# Patient Record
Sex: MALE | Race: WHITE | NOT HISPANIC OR LATINO | URBAN - METROPOLITAN AREA
[De-identification: names, ages, dates, MRNs, and addresses within clinical notes are randomized per-mention and may not be internally consistent; named-entity substitution may affect disease eponyms.]

---

## 2017-01-13 ENCOUNTER — OFFICE (AMBULATORY)
Dept: URBAN - METROPOLITAN AREA CLINIC 64 | Facility: CLINIC | Age: 57
End: 2017-01-13
Payer: COMMERCIAL

## 2017-01-13 VITALS
HEART RATE: 81 BPM | HEIGHT: 67 IN | DIASTOLIC BLOOD PRESSURE: 92 MMHG | WEIGHT: 112 LBS | SYSTOLIC BLOOD PRESSURE: 127 MMHG

## 2017-01-13 DIAGNOSIS — R10.84 GENERALIZED ABDOMINAL PAIN: ICD-10-CM

## 2017-01-13 DIAGNOSIS — R11.2 NAUSEA WITH VOMITING, UNSPECIFIED: ICD-10-CM

## 2017-01-13 DIAGNOSIS — R19.7 DIARRHEA, UNSPECIFIED: ICD-10-CM

## 2017-01-13 DIAGNOSIS — R14.0 ABDOMINAL DISTENSION (GASEOUS): ICD-10-CM

## 2017-01-13 PROCEDURE — 99213 OFFICE O/P EST LOW 20 MIN: CPT | Performed by: NURSE PRACTITIONER

## 2017-01-17 ENCOUNTER — HOSPITAL ENCOUNTER (OUTPATIENT)
Dept: CARDIOLOGY | Facility: HOSPITAL | Age: 57
Discharge: HOME | End: 2017-01-17
Attending: INTERNAL MEDICINE | Admitting: INTERNAL MEDICINE

## 2017-01-17 ENCOUNTER — HOSPITAL ENCOUNTER (OUTPATIENT)
Dept: ONCOLOGY | Facility: CLINIC | Age: 57
Discharge: HOME | End: 2017-01-17
Attending: INTERNAL MEDICINE | Admitting: INTERNAL MEDICINE

## 2017-01-20 ENCOUNTER — INPATIENT HOSPITAL (AMBULATORY)
Dept: URBAN - METROPOLITAN AREA HOSPITAL 84 | Facility: HOSPITAL | Age: 57
End: 2017-01-20
Payer: COMMERCIAL

## 2017-01-20 DIAGNOSIS — K62.4 STENOSIS OF ANUS AND RECTUM: ICD-10-CM

## 2017-01-20 PROCEDURE — 45337 SIGMOIDOSCOPY & DECOMPRESS: CPT | Performed by: INTERNAL MEDICINE

## 2017-01-21 PROCEDURE — 99231 SBSQ HOSP IP/OBS SF/LOW 25: CPT | Performed by: INTERNAL MEDICINE

## 2017-01-23 ENCOUNTER — INPATIENT HOSPITAL (AMBULATORY)
Dept: URBAN - METROPOLITAN AREA HOSPITAL 84 | Facility: HOSPITAL | Age: 57
End: 2017-01-23
Payer: COMMERCIAL

## 2017-01-23 DIAGNOSIS — K56.7 ILEUS, UNSPECIFIED: ICD-10-CM

## 2017-01-23 DIAGNOSIS — C20 MALIGNANT NEOPLASM OF RECTUM: ICD-10-CM

## 2017-01-23 PROCEDURE — 99231 SBSQ HOSP IP/OBS SF/LOW 25: CPT | Performed by: NURSE PRACTITIONER

## 2017-02-08 ENCOUNTER — HOSPITAL ENCOUNTER (OUTPATIENT)
Dept: WOUND CARE | Facility: HOSPITAL | Age: 57
Discharge: HOME | End: 2017-02-08
Attending: NURSE PRACTITIONER | Admitting: NURSE PRACTITIONER

## 2017-02-13 ENCOUNTER — ON CAMPUS - OUTPATIENT (AMBULATORY)
Dept: URBAN - METROPOLITAN AREA HOSPITAL 77 | Facility: HOSPITAL | Age: 57
End: 2017-02-13
Payer: COMMERCIAL

## 2017-02-13 DIAGNOSIS — K62.4 STENOSIS OF ANUS AND RECTUM: ICD-10-CM

## 2017-02-13 DIAGNOSIS — Z85.040 PERSONAL HISTORY OF MALIGNANT CARCINOID TUMOR OF RECTUM: ICD-10-CM

## 2017-02-13 PROCEDURE — 45391 COLONOSCOPY W/ENDOSCOPE US: CPT | Performed by: INTERNAL MEDICINE

## 2017-02-15 ENCOUNTER — HOSPITAL ENCOUNTER (OUTPATIENT)
Dept: WOUND CARE | Facility: HOSPITAL | Age: 57
Discharge: HOME | End: 2017-02-15
Attending: NURSE PRACTITIONER | Admitting: NURSE PRACTITIONER

## 2017-02-20 ENCOUNTER — HOSPITAL ENCOUNTER (OUTPATIENT)
Dept: RADIATION ONCOLOGY | Facility: HOSPITAL | Age: 57
Discharge: HOME | End: 2017-02-20
Attending: RADIOLOGY | Admitting: RADIOLOGY

## 2017-02-20 ENCOUNTER — HOSPITAL ENCOUNTER (OUTPATIENT)
Dept: ONCOLOGY | Facility: CLINIC | Age: 57
Discharge: HOME | End: 2017-02-20
Attending: INTERNAL MEDICINE | Admitting: INTERNAL MEDICINE

## 2017-03-21 ENCOUNTER — HOSPITAL ENCOUNTER (OUTPATIENT)
Dept: PREOP | Facility: HOSPITAL | Age: 57
Setting detail: HOSPITAL OUTPATIENT SURGERY
Discharge: HOME | End: 2017-03-21
Attending: SURGERY | Admitting: SURGERY

## 2017-03-28 ENCOUNTER — HOSPITAL ENCOUNTER (OUTPATIENT)
Dept: ONCOLOGY | Facility: CLINIC | Age: 57
Discharge: HOME | End: 2017-03-28
Attending: INTERNAL MEDICINE | Admitting: INTERNAL MEDICINE

## 2017-03-28 LAB
ALBUMIN SERPL-MCNC: 3.6 G/DL (ref 3.5–4.8)
ALBUMIN/GLOB SERPL: 1.3 {RATIO} (ref 1–1.7)
ALP SERPL-CCNC: 118 IU/L (ref 32–91)
ALT SERPL-CCNC: 27 IU/L (ref 17–63)
ANION GAP SERPL CALC-SCNC: 13.1 MMOL/L (ref 10–20)
AST SERPL-CCNC: 32 IU/L (ref 15–41)
BILIRUB SERPL-MCNC: 0.6 MG/DL (ref 0.3–1.2)
BUN SERPL-MCNC: 10 MG/DL (ref 8–20)
BUN/CREAT SERPL: 14.3 (ref 6.2–20.3)
CALCIUM SERPL-MCNC: 9 MG/DL (ref 8.9–10.3)
CHLORIDE SERPL-SCNC: 107 MMOL/L (ref 101–111)
CONV CO2: 27 MMOL/L (ref 22–32)
CONV TOTAL PROTEIN: 6.4 G/DL (ref 6.1–7.9)
CREAT UR-MCNC: 0.7 MG/DL (ref 0.7–1.2)
GLOBULIN UR ELPH-MCNC: 2.8 G/DL (ref 2.5–3.8)
GLUCOSE SERPL-MCNC: 81 MG/DL (ref 65–99)
POTASSIUM SERPL-SCNC: 4.1 MMOL/L (ref 3.6–5.1)
SODIUM SERPL-SCNC: 143 MMOL/L (ref 136–144)

## 2017-04-13 ENCOUNTER — HOSPITAL ENCOUNTER (OUTPATIENT)
Dept: ONCOLOGY | Facility: CLINIC | Age: 57
Discharge: HOME | End: 2017-04-13
Attending: INTERNAL MEDICINE | Admitting: INTERNAL MEDICINE

## 2017-04-24 ENCOUNTER — HOSPITAL ENCOUNTER (OUTPATIENT)
Dept: ONCOLOGY | Facility: CLINIC | Age: 57
Discharge: HOME | End: 2017-04-24
Attending: INTERNAL MEDICINE | Admitting: INTERNAL MEDICINE

## 2017-05-18 ENCOUNTER — OFFICE (AMBULATORY)
Dept: URBAN - METROPOLITAN AREA CLINIC 51 | Facility: CLINIC | Age: 57
End: 2017-05-18
Payer: COMMERCIAL

## 2017-05-18 DIAGNOSIS — K62.4 STENOSIS OF ANUS AND RECTUM: ICD-10-CM

## 2017-05-18 DIAGNOSIS — R19.5 OTHER FECAL ABNORMALITIES: ICD-10-CM

## 2017-05-18 DIAGNOSIS — Z93.3 COLOSTOMY STATUS: ICD-10-CM

## 2017-05-18 DIAGNOSIS — K62.89 OTHER SPECIFIED DISEASES OF ANUS AND RECTUM: ICD-10-CM

## 2017-05-18 PROCEDURE — 91122: CPT | Performed by: INTERNAL MEDICINE

## 2017-05-18 PROCEDURE — 91120: CPT | Mod: 59 | Performed by: INTERNAL MEDICINE

## 2017-05-23 ENCOUNTER — HOSPITAL ENCOUNTER (OUTPATIENT)
Dept: ONCOLOGY | Facility: CLINIC | Age: 57
Discharge: HOME | End: 2017-05-23
Attending: INTERNAL MEDICINE | Admitting: INTERNAL MEDICINE

## 2017-06-20 ENCOUNTER — HOSPITAL ENCOUNTER (OUTPATIENT)
Dept: ONCOLOGY | Facility: CLINIC | Age: 57
Discharge: HOME | End: 2017-06-20
Attending: INTERNAL MEDICINE | Admitting: INTERNAL MEDICINE

## 2017-06-20 LAB
ALBUMIN SERPL-MCNC: 4 G/DL (ref 3.5–4.8)
ALBUMIN/GLOB SERPL: 1.5 {RATIO} (ref 1–1.7)
ALP SERPL-CCNC: 103 IU/L (ref 32–91)
ALT SERPL-CCNC: 25 IU/L (ref 17–63)
ANION GAP SERPL CALC-SCNC: 12.5 MMOL/L (ref 10–20)
AST SERPL-CCNC: 30 IU/L (ref 15–41)
BILIRUB SERPL-MCNC: 0.8 MG/DL (ref 0.3–1.2)
BUN SERPL-MCNC: 6 MG/DL (ref 8–20)
BUN/CREAT SERPL: 6.7 (ref 6.2–20.3)
CALCIUM SERPL-MCNC: 8.8 MG/DL (ref 8.9–10.3)
CHLORIDE SERPL-SCNC: 108 MMOL/L (ref 101–111)
CONV CO2: 24 MMOL/L (ref 22–32)
CONV TOTAL PROTEIN: 6.7 G/DL (ref 6.1–7.9)
CREAT UR-MCNC: 0.9 MG/DL (ref 0.7–1.2)
FOLATE SERPL-MCNC: 16.1 NG/ML (ref 5.9–24.8)
GLOBULIN UR ELPH-MCNC: 2.7 G/DL (ref 2.5–3.8)
GLUCOSE SERPL-MCNC: 116 MG/DL (ref 65–99)
LDH SERPL-CCNC: 171 IU/L (ref 98–192)
MAGNESIUM UR-MCNC: 1.06 % (ref 0.5–1.5)
POTASSIUM SERPL-SCNC: 3.5 MMOL/L (ref 3.6–5.1)
RETICS/RBC NFR MANUAL: 0.05 10*6/UL
SODIUM SERPL-SCNC: 141 MMOL/L (ref 136–144)

## 2017-06-21 LAB
ANA SER QL IA: NORMAL
HAPTOGLOB SERPL-MCNC: 97 MG/DL (ref 36–195)
PROT PATTERN SERPL IFE-IMP: NORMAL

## 2017-06-22 LAB
ALBUMIN SERPL-MCNC: 3.8 G/DL (ref 3.5–4.8)
ALPHA1 GLOB FLD ELPH-MCNC: 0.2 GM/DL (ref 0.1–0.4)
ALPHA2 GLOB SERPL ELPH-MCNC: 0.6 GM/DL (ref 0.5–1)
B-GLOBULIN SERPL ELPH-MCNC: 1.1 GM/DL (ref 0.7–1.4)
CONV TOTAL PROTEIN: 6.7 G/DL (ref 6.1–7.9)
GAMMA GLOB SERPL ELPH-MCNC: 1 GM/DL (ref 0.6–1.6)
INSULIN SERPL-ACNC: NORMAL U[IU]/ML

## 2017-07-03 ENCOUNTER — HOSPITAL ENCOUNTER (OUTPATIENT)
Dept: ONCOLOGY | Facility: CLINIC | Age: 57
Discharge: HOME | End: 2017-07-03
Attending: INTERNAL MEDICINE | Admitting: INTERNAL MEDICINE

## 2017-07-25 ENCOUNTER — HOSPITAL ENCOUNTER (OUTPATIENT)
Dept: ONCOLOGY | Facility: CLINIC | Age: 57
Discharge: HOME | End: 2017-07-25
Attending: INTERNAL MEDICINE | Admitting: INTERNAL MEDICINE

## 2017-08-22 ENCOUNTER — HOSPITAL ENCOUNTER (OUTPATIENT)
Dept: ONCOLOGY | Facility: CLINIC | Age: 57
Discharge: HOME | End: 2017-08-22
Attending: INTERNAL MEDICINE | Admitting: INTERNAL MEDICINE

## 2017-09-26 ENCOUNTER — CLINICAL SUPPORT (OUTPATIENT)
Dept: ONCOLOGY | Facility: HOSPITAL | Age: 57
End: 2017-09-26

## 2017-09-26 ENCOUNTER — HOSPITAL ENCOUNTER (OUTPATIENT)
Dept: ONCOLOGY | Facility: HOSPITAL | Age: 57
Discharge: HOME | End: 2017-09-26
Attending: INTERNAL MEDICINE | Admitting: INTERNAL MEDICINE

## 2017-09-26 ENCOUNTER — HOSPITAL ENCOUNTER (OUTPATIENT)
Dept: ONCOLOGY | Facility: CLINIC | Age: 57
Setting detail: INFUSION SERIES
Discharge: HOME | End: 2017-09-26
Attending: INTERNAL MEDICINE | Admitting: INTERNAL MEDICINE

## 2017-09-26 NOTE — PROGRESS NOTES
PATIENTS ONCOLOGY RECORD LOCATED IN [facility]      Subjective     Name:  KASSIE HENLEY     Date:  2017  Address:  [address] [place] [address] [place] [facility]  Home: [phone]  :  1960 AGE:  57 y.o.        RECORDS OBTAINED:  Patients Oncology Record is located in [facility]

## 2017-10-24 ENCOUNTER — HOSPITAL ENCOUNTER (OUTPATIENT)
Dept: ONCOLOGY | Facility: HOSPITAL | Age: 57
Discharge: HOME | End: 2017-10-24
Attending: INTERNAL MEDICINE | Admitting: INTERNAL MEDICINE

## 2017-11-21 ENCOUNTER — CLINICAL SUPPORT (OUTPATIENT)
Dept: ONCOLOGY | Facility: HOSPITAL | Age: 57
End: 2017-11-21

## 2017-11-21 ENCOUNTER — HOSPITAL ENCOUNTER (OUTPATIENT)
Dept: ONCOLOGY | Facility: CLINIC | Age: 57
Setting detail: INFUSION SERIES
Discharge: HOME | End: 2017-11-21
Attending: INTERNAL MEDICINE | Admitting: INTERNAL MEDICINE

## 2017-11-21 ENCOUNTER — HOSPITAL ENCOUNTER (OUTPATIENT)
Dept: ONCOLOGY | Facility: HOSPITAL | Age: 57
Discharge: HOME | End: 2017-11-21
Attending: INTERNAL MEDICINE | Admitting: INTERNAL MEDICINE

## 2017-11-21 NOTE — PROGRESS NOTES
PATIENTS ONCOLOGY RECORD LOCATED IN [facility]      Subjective     Name:  KASSIE HENLEY     Date:  2017  Address:  [address] [place] [address] [place] [facility]  Home: [phone]  :  1960 AGE:  57 y.o.        RECORDS OBTAINED:  Patients Oncology Record is located in [facility]

## 2017-12-19 ENCOUNTER — HOSPITAL ENCOUNTER (OUTPATIENT)
Dept: ONCOLOGY | Facility: CLINIC | Age: 57
Setting detail: INFUSION SERIES
Discharge: HOME | End: 2017-12-19
Attending: INTERNAL MEDICINE | Admitting: INTERNAL MEDICINE

## 2017-12-19 ENCOUNTER — HOSPITAL ENCOUNTER (OUTPATIENT)
Dept: ONCOLOGY | Facility: HOSPITAL | Age: 57
Discharge: HOME | End: 2017-12-19
Attending: INTERNAL MEDICINE | Admitting: INTERNAL MEDICINE

## 2017-12-19 ENCOUNTER — CLINICAL SUPPORT (OUTPATIENT)
Dept: ONCOLOGY | Facility: HOSPITAL | Age: 57
End: 2017-12-19

## 2017-12-19 NOTE — PROGRESS NOTES
PATIENTS ONCOLOGY RECORD LOCATED IN [facility]      Subjective     Name:  KASSIE HENLEY     Date:  2017  Address:  [address] [place] [address] [place] [facility]  Home: [phone]  :  1960 AGE:  57 y.o.        RECORDS OBTAINED:  Patients Oncology Record is located in [facility]

## 2018-01-08 ENCOUNTER — HOSPITAL ENCOUNTER (OUTPATIENT)
Dept: ONCOLOGY | Facility: HOSPITAL | Age: 58
Discharge: HOME | End: 2018-01-08
Attending: INTERNAL MEDICINE | Admitting: INTERNAL MEDICINE

## 2018-01-08 LAB — CREAT BLDA-MCNC: 1 MG/DL (ref 0.6–1.3)

## 2018-01-09 ENCOUNTER — HOSPITAL ENCOUNTER (OUTPATIENT)
Dept: ONCOLOGY | Facility: HOSPITAL | Age: 58
Discharge: HOME | End: 2018-01-09
Attending: INTERNAL MEDICINE | Admitting: INTERNAL MEDICINE

## 2018-01-09 ENCOUNTER — HOSPITAL ENCOUNTER (OUTPATIENT)
Dept: ONCOLOGY | Facility: CLINIC | Age: 58
Setting detail: INFUSION SERIES
Discharge: HOME | End: 2018-01-09
Attending: INTERNAL MEDICINE | Admitting: INTERNAL MEDICINE

## 2018-01-09 ENCOUNTER — CLINICAL SUPPORT (OUTPATIENT)
Dept: ONCOLOGY | Facility: HOSPITAL | Age: 58
End: 2018-01-09

## 2018-01-09 LAB
ALBUMIN SERPL-MCNC: 4 G/DL (ref 3.5–4.8)
ALBUMIN/GLOB SERPL: 1.3 {RATIO} (ref 1–1.7)
ALP SERPL-CCNC: 86 IU/L (ref 32–91)
ALT SERPL-CCNC: 17 IU/L (ref 17–63)
ANION GAP SERPL CALC-SCNC: 11 MMOL/L (ref 10–20)
AST SERPL-CCNC: 22 IU/L (ref 15–41)
BILIRUB SERPL-MCNC: 0.8 MG/DL (ref 0.3–1.2)
BUN SERPL-MCNC: 8 MG/DL (ref 8–20)
BUN/CREAT SERPL: 7.3 (ref 6.2–20.3)
CALCIUM SERPL-MCNC: 9.5 MG/DL (ref 8.9–10.3)
CHLORIDE SERPL-SCNC: 104 MMOL/L (ref 101–111)
CONV CO2: 28 MMOL/L (ref 22–32)
CONV TOTAL PROTEIN: 7 G/DL (ref 6.1–7.9)
CREAT UR-MCNC: 1.1 MG/DL (ref 0.7–1.2)
GLOBULIN UR ELPH-MCNC: 3 G/DL (ref 2.5–3.8)
GLUCOSE SERPL-MCNC: 86 MG/DL (ref 65–99)
POTASSIUM SERPL-SCNC: 4 MMOL/L (ref 3.6–5.1)
SODIUM SERPL-SCNC: 139 MMOL/L (ref 136–144)

## 2018-01-09 NOTE — PROGRESS NOTES
PATIENTS ONCOLOGY RECORD LOCATED IN [facility]      Subjective     Name:  KASSIE HENLEY     Date:  2018  Address:  [address] [place] [address] [place] [facility]  Home: [phone]  :  1960 AGE:  57 y.o.        RECORDS OBTAINED:  Patients Oncology Record is located in [facility]

## 2018-02-06 ENCOUNTER — CLINICAL SUPPORT (OUTPATIENT)
Dept: ONCOLOGY | Facility: HOSPITAL | Age: 58
End: 2018-02-06

## 2018-02-06 ENCOUNTER — HOSPITAL ENCOUNTER (OUTPATIENT)
Dept: ONCOLOGY | Facility: CLINIC | Age: 58
Setting detail: INFUSION SERIES
Discharge: HOME | End: 2018-02-06
Attending: INTERNAL MEDICINE | Admitting: INTERNAL MEDICINE

## 2018-02-06 NOTE — PROGRESS NOTES
PATIENTS ONCOLOGY RECORD LOCATED IN [facility]      Subjective     Name:  KASSIE HENLEY     Date:  2018  Address:  [address] [place] [address] [place] [facility]  Home: [phone]  :  1960 AGE:  57 y.o.        RECORDS OBTAINED:  Patients Oncology Record is located in [facility]

## 2018-03-06 ENCOUNTER — CLINICAL SUPPORT (OUTPATIENT)
Dept: ONCOLOGY | Facility: HOSPITAL | Age: 58
End: 2018-03-06

## 2018-03-06 ENCOUNTER — HOSPITAL ENCOUNTER (OUTPATIENT)
Dept: ONCOLOGY | Facility: CLINIC | Age: 58
Setting detail: INFUSION SERIES
Discharge: HOME | End: 2018-03-06
Attending: INTERNAL MEDICINE | Admitting: INTERNAL MEDICINE

## 2018-03-06 NOTE — PROGRESS NOTES
PATIENTS ONCOLOGY RECORD LOCATED IN [facility]      Subjective     Name:  KASSIE HENLEY     Date:  2018  Address:  [address] [place] [address] [place] [facility]  Home: [phone]  :  1960 AGE:  57 y.o.        RECORDS OBTAINED:  Patients Oncology Record is located in [facility]

## 2018-04-03 ENCOUNTER — CLINICAL SUPPORT (OUTPATIENT)
Dept: ONCOLOGY | Facility: HOSPITAL | Age: 58
End: 2018-04-03

## 2018-04-03 ENCOUNTER — HOSPITAL ENCOUNTER (OUTPATIENT)
Dept: ONCOLOGY | Facility: CLINIC | Age: 58
Setting detail: INFUSION SERIES
Discharge: HOME | End: 2018-04-03
Attending: INTERNAL MEDICINE | Admitting: INTERNAL MEDICINE

## 2018-04-03 NOTE — PROGRESS NOTES
PATIENTS ONCOLOGY RECORD LOCATED IN [facility]      Subjective     Name:  KASSIE HENLEY     Date:  2018  Address:  [address] [place] [address] [place] [facility]  Home: [phone]  :  1960 AGE:  57 y.o.        RECORDS OBTAINED:  Patients Oncology Record is located in [facility]

## 2018-05-08 ENCOUNTER — HOSPITAL ENCOUNTER (OUTPATIENT)
Dept: ONCOLOGY | Facility: HOSPITAL | Age: 58
Discharge: HOME | End: 2018-05-08
Attending: INTERNAL MEDICINE | Admitting: INTERNAL MEDICINE

## 2018-05-08 ENCOUNTER — HOSPITAL ENCOUNTER (OUTPATIENT)
Dept: ONCOLOGY | Facility: CLINIC | Age: 58
Setting detail: INFUSION SERIES
Discharge: HOME | End: 2018-05-08
Attending: INTERNAL MEDICINE | Admitting: INTERNAL MEDICINE

## 2018-05-08 ENCOUNTER — CLINICAL SUPPORT (OUTPATIENT)
Dept: ONCOLOGY | Facility: HOSPITAL | Age: 58
End: 2018-05-08

## 2018-05-08 LAB
ALBUMIN SERPL-MCNC: 3.9 G/DL (ref 3.5–4.8)
ALBUMIN/GLOB SERPL: 1.3 {RATIO} (ref 1–1.7)
ALP SERPL-CCNC: 82 IU/L (ref 32–91)
ALT SERPL-CCNC: 16 IU/L (ref 17–63)
ANION GAP SERPL CALC-SCNC: 12.1 MMOL/L (ref 10–20)
AST SERPL-CCNC: 21 IU/L (ref 15–41)
BILIRUB SERPL-MCNC: 0.7 MG/DL (ref 0.3–1.2)
BUN SERPL-MCNC: 9 MG/DL (ref 8–20)
BUN/CREAT SERPL: 9 (ref 6.2–20.3)
CALCIUM SERPL-MCNC: 8.9 MG/DL (ref 8.9–10.3)
CHLORIDE SERPL-SCNC: 103 MMOL/L (ref 101–111)
CONV CO2: 26 MMOL/L (ref 22–32)
CONV TOTAL PROTEIN: 6.9 G/DL (ref 6.1–7.9)
CREAT UR-MCNC: 1 MG/DL (ref 0.7–1.2)
GLOBULIN UR ELPH-MCNC: 3 G/DL (ref 2.5–3.8)
GLUCOSE SERPL-MCNC: 92 MG/DL (ref 65–99)
POTASSIUM SERPL-SCNC: 4.1 MMOL/L (ref 3.6–5.1)
SODIUM SERPL-SCNC: 137 MMOL/L (ref 136–144)

## 2018-05-08 NOTE — PROGRESS NOTES
PATIENTS ONCOLOGY RECORD LOCATED IN [facility]      Subjective     Name:  KASSIE HENLEY     Date:  2018  Address:  [address] [place] [address] [place] [facility]  Home: [phone]  :  1960 AGE:  58 y.o.        RECORDS OBTAINED:  Patients Oncology Record is located in [facility]

## 2018-05-18 ENCOUNTER — CLINICAL SUPPORT (OUTPATIENT)
Dept: ONCOLOGY | Facility: HOSPITAL | Age: 58
End: 2018-05-18

## 2018-05-18 ENCOUNTER — HOSPITAL ENCOUNTER (OUTPATIENT)
Dept: ONCOLOGY | Facility: CLINIC | Age: 58
Setting detail: INFUSION SERIES
Discharge: HOME | End: 2018-05-18
Attending: INTERNAL MEDICINE | Admitting: INTERNAL MEDICINE

## 2018-05-18 NOTE — PROGRESS NOTES
PATIENTS ONCOLOGY RECORD LOCATED IN [facility]      Subjective     Name:  KASSIE HENLEY     Date:  2018  Address:  [address] [place] [address] [place] [facility]  Home: [phone]  :  1960 AGE:  58 y.o.        RECORDS OBTAINED:  Patients Oncology Record is located in [facility]

## 2018-05-31 ENCOUNTER — CLINICAL SUPPORT (OUTPATIENT)
Dept: ONCOLOGY | Facility: HOSPITAL | Age: 58
End: 2018-05-31

## 2018-05-31 ENCOUNTER — HOSPITAL ENCOUNTER (OUTPATIENT)
Dept: ONCOLOGY | Facility: CLINIC | Age: 58
Setting detail: INFUSION SERIES
Discharge: HOME | End: 2018-05-31
Attending: INTERNAL MEDICINE | Admitting: INTERNAL MEDICINE

## 2018-05-31 NOTE — PROGRESS NOTES
PATIENTS ONCOLOGY RECORD LOCATED IN [facility]      Subjective     Name:  KASSIE HENLEY     Date:  2018  Address:  [address] [place] [address] [place] [facility]  Home: [phone]  :  1960 AGE:  58 y.o.        RECORDS OBTAINED:  Patients Oncology Record is located in [facility]

## 2018-06-28 ENCOUNTER — CLINICAL SUPPORT (OUTPATIENT)
Dept: ONCOLOGY | Facility: HOSPITAL | Age: 58
End: 2018-06-28

## 2018-06-28 ENCOUNTER — HOSPITAL ENCOUNTER (OUTPATIENT)
Dept: ONCOLOGY | Facility: CLINIC | Age: 58
Setting detail: INFUSION SERIES
Discharge: HOME | End: 2018-06-28
Attending: INTERNAL MEDICINE | Admitting: INTERNAL MEDICINE

## 2018-06-28 NOTE — PROGRESS NOTES
PATIENTS ONCOLOGY RECORD LOCATED IN [facility]      Subjective     Name:  KASSIE HENLEY     Date:  2018  Address:  [address] [place] [address] [place] [facility]  Home: [phone]  :  1960 AGE:  58 y.o.        RECORDS OBTAINED:  Patients Oncology Record is located in [facility]

## 2018-07-26 ENCOUNTER — CLINICAL SUPPORT (OUTPATIENT)
Dept: ONCOLOGY | Facility: HOSPITAL | Age: 58
End: 2018-07-26

## 2018-07-26 ENCOUNTER — HOSPITAL ENCOUNTER (OUTPATIENT)
Dept: ONCOLOGY | Facility: CLINIC | Age: 58
Setting detail: INFUSION SERIES
Discharge: HOME | End: 2018-07-26
Attending: INTERNAL MEDICINE | Admitting: INTERNAL MEDICINE

## 2018-07-26 NOTE — PROGRESS NOTES
PATIENTS ONCOLOGY RECORD LOCATED IN [facility]      Subjective     Name:  KASSIE HENLEY     Date:  2018  Address:  [address] [place] [address] [place] [facility]  Home: [phone]  :  1960 AGE:  58 y.o.        RECORDS OBTAINED:  Patients Oncology Record is located in [facility]

## 2018-08-23 ENCOUNTER — CLINICAL SUPPORT (OUTPATIENT)
Dept: ONCOLOGY | Facility: HOSPITAL | Age: 58
End: 2018-08-23

## 2018-08-23 ENCOUNTER — HOSPITAL ENCOUNTER (OUTPATIENT)
Dept: ONCOLOGY | Facility: CLINIC | Age: 58
Setting detail: INFUSION SERIES
Discharge: HOME | End: 2018-08-23
Attending: INTERNAL MEDICINE | Admitting: INTERNAL MEDICINE

## 2018-08-23 NOTE — PROGRESS NOTES
PATIENTS ONCOLOGY RECORD LOCATED IN [facility]      Subjective     Name:  KASSIE HENLEY     Date:  2018  Address:  [address] [place] [address] [place] [facility]  Home: [phone]  :  1960 AGE:  58 y.o.        RECORDS OBTAINED:  Patients Oncology Record is located in [facility]

## 2018-09-27 ENCOUNTER — HOSPITAL ENCOUNTER (OUTPATIENT)
Dept: ONCOLOGY | Facility: CLINIC | Age: 58
Setting detail: INFUSION SERIES
Discharge: HOME | End: 2018-09-27
Attending: INTERNAL MEDICINE | Admitting: INTERNAL MEDICINE

## 2018-09-27 ENCOUNTER — CLINICAL SUPPORT (OUTPATIENT)
Dept: ONCOLOGY | Facility: HOSPITAL | Age: 58
End: 2018-09-27

## 2018-09-27 NOTE — PROGRESS NOTES
PATIENTS ONCOLOGY RECORD LOCATED IN [facility]      Subjective     Name:  KASSIE HENLEY     Date:  2018  Address:  [address] [place] [address] [place] [facility]  Home: [phone]  :  1960 AGE:  58 y.o.        RECORDS OBTAINED:  Patients Oncology Record is located in [facility]

## 2018-10-08 ENCOUNTER — HOSPITAL ENCOUNTER (OUTPATIENT)
Dept: CARDIOLOGY | Facility: HOSPITAL | Age: 58
Discharge: HOME | End: 2018-10-08
Attending: INTERNAL MEDICINE | Admitting: INTERNAL MEDICINE

## 2018-10-25 ENCOUNTER — HOSPITAL ENCOUNTER (OUTPATIENT)
Dept: ONCOLOGY | Facility: CLINIC | Age: 58
Setting detail: INFUSION SERIES
Discharge: HOME | End: 2018-10-25
Attending: INTERNAL MEDICINE | Admitting: INTERNAL MEDICINE

## 2018-10-25 ENCOUNTER — CLINICAL SUPPORT (OUTPATIENT)
Dept: ONCOLOGY | Facility: HOSPITAL | Age: 58
End: 2018-10-25

## 2018-10-25 NOTE — PROGRESS NOTES
PATIENTS ONCOLOGY RECORD LOCATED IN [facility]      Subjective     Name:  KASSIE HENLEY     Date:  10/25/2018  Address:  [address] [place] [facility]  Home: [phone]  :  1960 AGE:  58 y.o.        RECORDS OBTAINED:  Patients Oncology Record is located in [facility]

## 2018-11-29 ENCOUNTER — CLINICAL SUPPORT (OUTPATIENT)
Dept: ONCOLOGY | Facility: HOSPITAL | Age: 58
End: 2018-11-29

## 2018-11-29 ENCOUNTER — HOSPITAL ENCOUNTER (OUTPATIENT)
Dept: ONCOLOGY | Facility: CLINIC | Age: 58
Setting detail: INFUSION SERIES
Discharge: HOME | End: 2018-11-29
Attending: INTERNAL MEDICINE | Admitting: INTERNAL MEDICINE

## 2018-11-29 NOTE — PROGRESS NOTES
PATIENTS ONCOLOGY RECORD LOCATED IN [facility]      Subjective     Name:  KASSIE HENLEY     Date:  2018  Address:  [address] [place] [facility]  Home: [unfilled]  :  1960 AGE:  58 y.o.        RECORDS OBTAINED:  Patients Oncology Record is located in [facility]

## 2018-12-27 ENCOUNTER — HOSPITAL ENCOUNTER (OUTPATIENT)
Dept: ONCOLOGY | Facility: CLINIC | Age: 58
Setting detail: INFUSION SERIES
Discharge: HOME | End: 2018-12-27
Attending: INTERNAL MEDICINE | Admitting: INTERNAL MEDICINE

## 2018-12-27 ENCOUNTER — CLINICAL SUPPORT (OUTPATIENT)
Dept: ONCOLOGY | Facility: HOSPITAL | Age: 58
End: 2018-12-27

## 2018-12-27 NOTE — PROGRESS NOTES
PATIENTS ONCOLOGY RECORD LOCATED IN [facility]      Subjective     Name:  KASSIE HENLEY     Date:  2018  Address:  [address] [place] [facility]  Home: [unfilled]  :  1960 AGE:  58 y.o.        RECORDS OBTAINED:  Patients Oncology Record is located in [facility]

## 2019-01-24 ENCOUNTER — HOSPITAL ENCOUNTER (OUTPATIENT)
Dept: ONCOLOGY | Facility: HOSPITAL | Age: 59
Discharge: HOME | End: 2019-01-24
Attending: INTERNAL MEDICINE | Admitting: INTERNAL MEDICINE

## 2019-01-24 LAB — CREAT BLDA-MCNC: 1.1 MG/DL (ref 0.6–1.3)

## 2019-01-31 ENCOUNTER — CLINICAL SUPPORT (OUTPATIENT)
Dept: ONCOLOGY | Facility: HOSPITAL | Age: 59
End: 2019-01-31

## 2019-01-31 ENCOUNTER — HOSPITAL ENCOUNTER (OUTPATIENT)
Dept: ONCOLOGY | Facility: HOSPITAL | Age: 59
Discharge: HOME | End: 2019-01-31
Attending: INTERNAL MEDICINE | Admitting: INTERNAL MEDICINE

## 2019-01-31 ENCOUNTER — HOSPITAL ENCOUNTER (OUTPATIENT)
Dept: ONCOLOGY | Facility: CLINIC | Age: 59
Setting detail: INFUSION SERIES
Discharge: HOME | End: 2019-01-31
Attending: INTERNAL MEDICINE | Admitting: INTERNAL MEDICINE

## 2019-01-31 LAB
ALBUMIN SERPL-MCNC: 4 G/DL (ref 3.5–4.8)
ALBUMIN/GLOB SERPL: 1.5 {RATIO} (ref 1–1.7)
ALP SERPL-CCNC: 90 IU/L (ref 32–91)
ALT SERPL-CCNC: 15 IU/L (ref 17–63)
ANION GAP SERPL CALC-SCNC: 13.3 MMOL/L (ref 10–20)
AST SERPL-CCNC: 22 IU/L (ref 15–41)
BILIRUB SERPL-MCNC: 0.7 MG/DL (ref 0.3–1.2)
BUN SERPL-MCNC: 7 MG/DL (ref 8–20)
BUN/CREAT SERPL: 7 (ref 6.2–20.3)
CALCIUM SERPL-MCNC: 9 MG/DL (ref 8.9–10.3)
CHLORIDE SERPL-SCNC: 104 MMOL/L (ref 101–111)
CONV CO2: 26 MMOL/L (ref 22–32)
CONV TOTAL PROTEIN: 6.6 G/DL (ref 6.1–7.9)
CREAT UR-MCNC: 1 MG/DL (ref 0.7–1.2)
GLOBULIN UR ELPH-MCNC: 2.6 G/DL (ref 2.5–3.8)
GLUCOSE SERPL-MCNC: 94 MG/DL (ref 65–99)
POTASSIUM SERPL-SCNC: 4.3 MMOL/L (ref 3.6–5.1)
SODIUM SERPL-SCNC: 139 MMOL/L (ref 136–144)

## 2019-01-31 NOTE — PROGRESS NOTES
PATIENTS ONCOLOGY RECORD LOCATED IN [facility]      Subjective     Name:  KASSIE HENELY     Date:  2019  Address:  [address] [place] [facility]  Home: [unfilled]  :  1960 AGE:  58 y.o.        RECORDS OBTAINED:  Patients Oncology Record is located in [facility]

## 2019-02-21 ENCOUNTER — HOSPITAL ENCOUNTER (OUTPATIENT)
Dept: MRI IMAGING | Facility: HOSPITAL | Age: 59
Discharge: HOME | End: 2019-02-21
Attending: INTERNAL MEDICINE | Admitting: INTERNAL MEDICINE

## 2019-03-18 ENCOUNTER — HOSPITAL ENCOUNTER (OUTPATIENT)
Dept: CARDIOLOGY | Facility: HOSPITAL | Age: 59
Discharge: HOME | End: 2019-03-18
Attending: INTERNAL MEDICINE | Admitting: INTERNAL MEDICINE

## 2019-04-08 ENCOUNTER — OFFICE (AMBULATORY)
Dept: URBAN - METROPOLITAN AREA CLINIC 64 | Facility: CLINIC | Age: 59
End: 2019-04-08

## 2019-04-08 VITALS
DIASTOLIC BLOOD PRESSURE: 100 MMHG | WEIGHT: 142 LBS | HEART RATE: 71 BPM | SYSTOLIC BLOOD PRESSURE: 139 MMHG | HEIGHT: 67 IN

## 2019-04-08 DIAGNOSIS — R19.7 DIARRHEA, UNSPECIFIED: ICD-10-CM

## 2019-04-08 DIAGNOSIS — K94.00 COLOSTOMY COMPLICATION, UNSPECIFIED: ICD-10-CM

## 2019-04-08 DIAGNOSIS — R11.2 NAUSEA WITH VOMITING, UNSPECIFIED: ICD-10-CM

## 2019-04-08 DIAGNOSIS — C20 MALIGNANT NEOPLASM OF RECTUM: ICD-10-CM

## 2019-04-08 PROCEDURE — 99213 OFFICE O/P EST LOW 20 MIN: CPT | Performed by: INTERNAL MEDICINE

## 2019-05-08 ENCOUNTER — HOSPITAL ENCOUNTER (OUTPATIENT)
Dept: GASTROENTEROLOGY | Facility: HOSPITAL | Age: 59
Setting detail: HOSPITAL OUTPATIENT SURGERY
Discharge: HOME | End: 2019-05-08
Attending: INTERNAL MEDICINE | Admitting: INTERNAL MEDICINE

## 2019-05-08 ENCOUNTER — ON CAMPUS - OUTPATIENT (AMBULATORY)
Dept: URBAN - METROPOLITAN AREA HOSPITAL 85 | Facility: HOSPITAL | Age: 59
End: 2019-05-08

## 2019-05-08 DIAGNOSIS — Z90.49 ACQUIRED ABSENCE OF OTHER SPECIFIED PARTS OF DIGESTIVE TRACT: ICD-10-CM

## 2019-05-08 DIAGNOSIS — K62.7 RADIATION PROCTITIS: ICD-10-CM

## 2019-05-08 DIAGNOSIS — K57.30 DIVERTICULOSIS OF LARGE INTESTINE WITHOUT PERFORATION OR ABS: ICD-10-CM

## 2019-05-08 DIAGNOSIS — Z85.048 PERSONAL HISTORY OF OTHER MALIGNANT NEOPLASM OF RECTUM, RECT: ICD-10-CM

## 2019-05-08 PROCEDURE — 44388 COLONOSCOPY THRU STOMA SPX: CPT | Performed by: INTERNAL MEDICINE

## 2019-05-24 ENCOUNTER — HOSPITAL ENCOUNTER (OUTPATIENT)
Dept: MRI IMAGING | Facility: HOSPITAL | Age: 59
Discharge: HOME | End: 2019-05-24
Attending: INTERNAL MEDICINE | Admitting: INTERNAL MEDICINE

## 2019-05-30 ENCOUNTER — HOSPITAL ENCOUNTER (OUTPATIENT)
Dept: MRI IMAGING | Facility: HOSPITAL | Age: 59
Discharge: HOME | End: 2019-05-30
Attending: INTERNAL MEDICINE | Admitting: INTERNAL MEDICINE

## 2019-05-30 LAB — CREAT BLDA-MCNC: 1.1 MG/DL (ref 0.6–1.3)

## 2019-06-03 ENCOUNTER — HOSPITAL ENCOUNTER (OUTPATIENT)
Dept: ONCOLOGY | Facility: CLINIC | Age: 59
Setting detail: INFUSION SERIES
Discharge: HOME | End: 2019-06-03
Attending: INTERNAL MEDICINE | Admitting: INTERNAL MEDICINE

## 2019-06-03 ENCOUNTER — HOSPITAL ENCOUNTER (OUTPATIENT)
Dept: ONCOLOGY | Facility: HOSPITAL | Age: 59
Discharge: HOME | End: 2019-06-03
Attending: INTERNAL MEDICINE | Admitting: INTERNAL MEDICINE

## 2019-06-03 LAB
ALBUMIN SERPL-MCNC: 4 G/DL (ref 3.5–4.8)
ALBUMIN/GLOB SERPL: 1.4 {RATIO} (ref 1–1.7)
ALP SERPL-CCNC: 82 IU/L (ref 32–91)
ALT SERPL-CCNC: 13 IU/L (ref 17–63)
ANION GAP SERPL CALC-SCNC: 15 MMOL/L (ref 10–20)
AST SERPL-CCNC: 20 IU/L (ref 15–41)
BILIRUB SERPL-MCNC: 0.7 MG/DL (ref 0.3–1.2)
BUN SERPL-MCNC: 5 MG/DL (ref 8–20)
BUN/CREAT SERPL: 4.5 (ref 6.2–20.3)
CALCIUM SERPL-MCNC: 8.7 MG/DL (ref 8.9–10.3)
CEA SERPL-MCNC: 1.3 NG/ML (ref 0–5)
CHLORIDE SERPL-SCNC: 104 MMOL/L (ref 101–111)
CONV CO2: 24 MMOL/L (ref 22–32)
CONV TOTAL PROTEIN: 6.8 G/DL (ref 6.1–7.9)
CREAT UR-MCNC: 1.1 MG/DL (ref 0.7–1.2)
GLOBULIN UR ELPH-MCNC: 2.8 G/DL (ref 2.5–3.8)
GLUCOSE SERPL-MCNC: 77 MG/DL (ref 65–99)
POTASSIUM SERPL-SCNC: 4 MMOL/L (ref 3.6–5.1)
SODIUM SERPL-SCNC: 139 MMOL/L (ref 136–144)

## 2019-07-03 RX ORDER — LISINOPRIL 2.5 MG/1
2.5 TABLET ORAL DAILY
Refills: 5 | COMMUNITY
Start: 2019-05-10 | End: 2019-07-03 | Stop reason: SDUPTHER

## 2019-07-03 RX ORDER — LISINOPRIL 2.5 MG/1
2.5 TABLET ORAL DAILY
Qty: 90 TABLET | Refills: 1 | Status: SHIPPED | OUTPATIENT
Start: 2019-07-03 | End: 2019-09-05 | Stop reason: SDUPTHER

## 2019-07-08 RX ORDER — METOPROLOL SUCCINATE 25 MG/1
TABLET, EXTENDED RELEASE ORAL
Qty: 30 TABLET | Refills: 0 | Status: SHIPPED | OUTPATIENT
Start: 2019-07-08 | End: 2019-07-08 | Stop reason: SDUPTHER

## 2019-07-08 RX ORDER — METOPROLOL SUCCINATE 25 MG/1
TABLET, EXTENDED RELEASE ORAL
Qty: 90 TABLET | Refills: 2 | Status: SHIPPED | OUTPATIENT
Start: 2019-07-08 | End: 2020-03-30

## 2019-09-05 RX ORDER — LISINOPRIL 2.5 MG/1
2.5 TABLET ORAL DAILY
Qty: 90 TABLET | Refills: 0 | Status: SHIPPED | OUTPATIENT
Start: 2019-09-05 | End: 2019-11-29 | Stop reason: SDUPTHER

## 2019-09-05 RX ORDER — LISINOPRIL 2.5 MG/1
2.5 TABLET ORAL DAILY
Qty: 30 TABLET | Refills: 0 | Status: SHIPPED | OUTPATIENT
Start: 2019-09-05 | End: 2019-09-05 | Stop reason: SDUPTHER

## 2019-10-03 ENCOUNTER — CLINICAL SUPPORT NO REQUIREMENTS (OUTPATIENT)
Dept: CARDIOLOGY | Facility: CLINIC | Age: 59
End: 2019-10-03

## 2019-10-03 DIAGNOSIS — R00.1 BRADYCARDIA, SINUS: ICD-10-CM

## 2019-10-03 DIAGNOSIS — Z95.0 PACEMAKER: Primary | ICD-10-CM

## 2019-10-03 PROCEDURE — 93280 PM DEVICE PROGR EVAL DUAL: CPT | Performed by: INTERNAL MEDICINE

## 2019-10-14 ENCOUNTER — APPOINTMENT (OUTPATIENT)
Dept: LAB | Facility: HOSPITAL | Age: 59
End: 2019-10-14

## 2019-10-14 ENCOUNTER — OFFICE VISIT (OUTPATIENT)
Dept: ONCOLOGY | Facility: CLINIC | Age: 59
End: 2019-10-14

## 2019-10-14 VITALS
HEART RATE: 72 BPM | DIASTOLIC BLOOD PRESSURE: 80 MMHG | RESPIRATION RATE: 18 BRPM | SYSTOLIC BLOOD PRESSURE: 121 MMHG | BODY MASS INDEX: 21.63 KG/M2 | HEIGHT: 67 IN | WEIGHT: 137.8 LBS | TEMPERATURE: 97.7 F

## 2019-10-14 DIAGNOSIS — K76.9 LIVER LESION: ICD-10-CM

## 2019-10-14 DIAGNOSIS — Z85.048 HISTORY OF RECTAL CANCER: Primary | ICD-10-CM

## 2019-10-14 LAB
ALBUMIN SERPL-MCNC: 3.5 G/DL (ref 3.5–4.8)
ALBUMIN/GLOB SERPL: 1.1 G/DL (ref 1–1.7)
ALP SERPL-CCNC: 62 U/L (ref 32–91)
ALT SERPL W P-5'-P-CCNC: 16 U/L (ref 17–63)
ANION GAP SERPL CALCULATED.3IONS-SCNC: 13.1 MMOL/L (ref 5–15)
AST SERPL-CCNC: 21 U/L (ref 15–41)
BASOPHILS # BLD AUTO: 0.02 10*3/MM3 (ref 0–0.2)
BASOPHILS NFR BLD AUTO: 0.3 % (ref 0–1.5)
BILIRUB SERPL-MCNC: 0.8 MG/DL (ref 0.3–1.2)
BUN BLD-MCNC: 9 MG/DL (ref 8–20)
BUN/CREAT SERPL: 9 (ref 6.2–20.3)
CALCIUM SPEC-SCNC: 8.6 MG/DL (ref 8.9–10.3)
CEA SERPL-MCNC: 1.09 NG/ML (ref 0–5)
CHLORIDE SERPL-SCNC: 104 MMOL/L (ref 101–111)
CO2 SERPL-SCNC: 28 MMOL/L (ref 22–32)
CREAT BLD-MCNC: 1 MG/DL (ref 0.7–1.2)
DEPRECATED RDW RBC AUTO: 43.9 FL (ref 37–54)
EOSINOPHIL # BLD AUTO: 0.14 10*3/MM3 (ref 0–0.4)
EOSINOPHIL NFR BLD AUTO: 2.2 % (ref 0.3–6.2)
ERYTHROCYTE [DISTWIDTH] IN BLOOD BY AUTOMATED COUNT: 13.5 % (ref 12.3–15.4)
GFR SERPL CREATININE-BSD FRML MDRD: 76 ML/MIN/1.73
GLOBULIN UR ELPH-MCNC: 3.1 GM/DL (ref 2.5–3.8)
GLUCOSE BLD-MCNC: 77 MG/DL (ref 65–99)
HCT VFR BLD AUTO: 41.4 % (ref 37.5–51)
HGB BLD-MCNC: 13.8 G/DL (ref 13–17.7)
LYMPHOCYTES # BLD AUTO: 0.74 10*3/MM3 (ref 0.7–3.1)
LYMPHOCYTES NFR BLD AUTO: 11.4 % (ref 19.6–45.3)
MCH RBC QN AUTO: 30 PG (ref 26.6–33)
MCHC RBC AUTO-ENTMCNC: 33.3 G/DL (ref 31.5–35.7)
MCV RBC AUTO: 90 FL (ref 79–97)
MONOCYTES # BLD AUTO: 0.59 10*3/MM3 (ref 0.1–0.9)
MONOCYTES NFR BLD AUTO: 9.1 % (ref 5–12)
NEUTROPHILS # BLD AUTO: 5 10*3/MM3 (ref 1.7–7)
NEUTROPHILS NFR BLD AUTO: 77 % (ref 42.7–76)
PLATELET # BLD AUTO: 172 10*3/MM3 (ref 140–450)
PMV BLD AUTO: 12 FL (ref 6–12)
POTASSIUM BLD-SCNC: 4.1 MMOL/L (ref 3.6–5.1)
PROT SERPL-MCNC: 6.6 G/DL (ref 6.1–7.9)
RBC # BLD AUTO: 4.6 10*6/MM3 (ref 4.14–5.8)
SODIUM BLD-SCNC: 141 MMOL/L (ref 136–144)
WBC NRBC COR # BLD: 6.49 10*3/MM3 (ref 3.4–10.8)

## 2019-10-14 PROCEDURE — 99214 OFFICE O/P EST MOD 30 MIN: CPT | Performed by: INTERNAL MEDICINE

## 2019-10-14 PROCEDURE — 82378 CARCINOEMBRYONIC ANTIGEN: CPT | Performed by: NURSE PRACTITIONER

## 2019-10-14 PROCEDURE — 85025 COMPLETE CBC W/AUTO DIFF WBC: CPT | Performed by: INTERNAL MEDICINE

## 2019-10-14 PROCEDURE — 80053 COMPREHEN METABOLIC PANEL: CPT | Performed by: NURSE PRACTITIONER

## 2019-10-14 RX ORDER — ASPIRIN 81 MG/1
TABLET ORAL EVERY 24 HOURS
COMMUNITY
Start: 2019-03-07

## 2019-10-14 RX ORDER — LANOLIN ALCOHOL/MO/W.PET/CERES
1000 CREAM (GRAM) TOPICAL DAILY
COMMUNITY
End: 2020-04-06

## 2019-10-14 NOTE — PROGRESS NOTES
Hematology/Oncology Outpatient Follow Up    PATIENT NAME:Dave Aguilar  :1960  MRN: [id number]  PRIMARY CARE PHYSICIAN: Renato Foreman MD  REFERRING PHYSICIAN: Renato Foreman MD    Chief Complaint   Patient presents with   • Follow-up     Rectal cancer   • Follow-up     B12 deficiency   • Follow-up     Liver lesion        HISTORY OF PRESENT ILLNESS:     This is a 59-year-old male who was diagnosed with stage III rectal adenocarcinoma.  Patient had presented with rectal bleeding while admitted to the hospital on 5/14/15.  At the time he had a colonoscopy that showed a rectal mass at 10 cm with near obstruction.  Patient did not have any clinical signs of colon obstruction.  • He had CT scan of the abdomen and pelvis done which revealed no evidence of liver involvement, but he had a rectal mass measuring 5.5 x 5.2 cm with wall thickening.  There were nonpathologically enlarged lymph nodes seen in the perirectal area.  One of the lymph nodes measured 5 mm.  He also had a CT scan of the chest which showed no acute abnormality within the chest.    • MRI of the pelvis was subsequently done and revealed rectal mass with viscerale tissue involvement and mildly enlarged perirectal lymphadenopathy.    • 5/8/15 - Pathology from rectal mass biopsy showed invasive, moderately differentiated adenocarcinoma, fragments of tubular adenoma.    • Due to significant amount of bleeding, patient had consultation with Dr. Whittaker who recommended a course of 9 Gy to be delivered in 3 fractions to control rectal bleeding and subsequently patient will be treated with daily XRT administered at 1.8 Gy per fraction for a total of 45 Gy along with concurrent chemotherapy.    • 5/9/15 - Serum CEA was measured at 10.   • 5/13/15 - Patient had permanent pacemaker implantation for syncope bradycardia secondary to sinus node dysfunction.   • Patient has been initiated on combined chemotherapy and radiation with continuous infusion  5-FU at 225 mg/M2 daily throughout the course of radiation treatment.  His treatment was initiated on 5/18/15.    • 5/21/15 - PET/CT scan showed increased uptake in the rectum as expected with SUV of 11.98.  There was no evidence of local lymphadenopathy or hypermetabolic activity in the pelvis or inguinal nodes.  There was mild swirling of the distal mesentries which is totally asymptomatic.  No evidence of distant metastases was otherwise noted.    • 6/18/15 - Patient completed his neoadjuvant chemotherapy and radiation.    • 7/29/15 - Patient had an MRI of the abdomen and pelvis.  This revealed mass in the upper part of the rectum measuring 4.5 cm, smaller than on the prior exam.  The two perirectal nodes which were described originally appeared smaller.  The rest of his abdomen was unremarkable.    • 8/7/15 - Patient was taken to the operating room by Dr. Maria L Fairchild and underwent low anterior resection with low pelvic stapled anastomosis.  Mobilization of the splenic flexure and diverting loop ileostomy.  Pathology revealed moderately differentiated adenocarcinoma.  The tumor measured 3.5 cm.  The tumor was located above the peritoneal reflection.  There was no evidence of perforation.  There was no evidence of microsatellite instability.  Tumor invaded through muscularis propria into the subserosal adipose tissue.  There was no evidence of lymphovascular invasion, but there was evidence of perineural invasion.  All margins were negative.  Distance to the closest margin was 3 cm.  Pathologic stage is [id number].  Total of 23 lymph nodes were examined.  All were negative for metastatic carcinoma.    • 9/14/15 - Patient was initiated on cycle 1, day 1 of adjuvant FOLFOX 6.    • 10/26/15 - Treatment #4 of FOLFOX 6.  • 12/14/15 - Patient had cycle 7 of adjuvant FOLFOX.  • 2/8/16 - Patient had cycle 10 of FOLFOX 6.   • 3/7/16 - Patient received cycle 12 of FOLFOX 6.   • 3/17/16 - Patient was admitted to the hospital with  syncopal episode.  Patient was found to be hypotensive in the emergency room.  He was resuscitated with IV fluids.  He also had evidence of hepatic dysfunction with total bilirubin elevated to 5 and elevated liver function tests.    • 3/17/16 - During the hospitalization he had a CT scan of the abdomen and pelvis.  This revealed new 3.3 x 1.2 cm nonspecific fluid collection in the perirectal tissue.  There was also a 1.2 cm hypodense lesion in the right hepatic lobe.  MRI was recommended.  There was nonspecific thickening of the gallbladder wall and there was new mild hydronephrosis bilaterally.  Subsequently patient had an ultrasound of the abdomen which showed small amount of biliary sludge in the gallbladder, but no gallstones.  No definite findings to suggest cholecystitis.  CT scan of the head was negative for acute intracranial process.    • 3/18/16 - MRI of the abdomen with contrast revealed the area in the liver was atypical for colorectal metastases and this was thought to be most likely due to AV malformation.  Follow up MRI in three to six months was recommended.  The right hydronephrosis had resolved, but there was mild persistent hydronephrosis on the left.  • Patient was seen by Dr. Fairchild who was not too concerned about the perirectal fluid collection, but recommended antibiotics and rescanning in a few weeks to reevaluate.  Patient was also seen by Dr. Feldman with Urology service.  His bilateral hydronephrosis improved.  Throughout the hospitalization, patient also developed pancytopenia with leukopenia despite Neulasta.  During the course of his hospitalization he was noted to be hypotensive.  Patient was placed on Midodrine.    • 4/15/16 - CT scan of the abdomen and pelvis.  This basically showed persistent presacral soft tissue thickening with central fluid component.  Mild nonspecific thickening of the wall of the colon.    • 5/18/16 - PET/CT scan was essentially unremarkable.  No evidence of  recurrent disease or increased activity in the abdomen or pelvis to suggest metastatic disease.    • 6/2/16 - Colonoscopy and reversal of ileostomy.  The colonoscopy was essentially unremarkable.    • 6/17/16 - CT scan of the abdomen which showed evidence of post-op changes.  There was a 1.2 cm enhancing lesion in the posterior right hepatic lobe, stable in size.  Potential ileus.    • 8/17/16 - Patient had MRI of the liver which did not reveal any lesions in the liver.  No abnormal enhancing liver lesions to indicate metastatic disease were seen.  There was an abundant amount of stool throughout markedly dilated colon.  Patient has severe ileus with fecal impaction.  Patient has since then been seen by Dr. Fairchild.  He has another appointment scheduled for next week.    • 12/6/16 - CEA 1.8.  Chemistry panel:  BUN 4, creatinine 0.9.  • Patient was admitted to the hospital in January 2017.  At the time he underwent a diverting colostomy due to chronic rectal stricture resulting in colonic distention and small bowel distention.    • 1/19/17 - CT scan of the abdomen and pelvis which showed marked gaseous distention of the majority of the small bowel and entire colon.    • 3/21/17 - Patient had a colonoscopy per rectum and also stoma with dilatation of rectal stricture.  There were no masses identified.  There was mild narrowing, but no critical stricture of the rectum.  There was evidence of poor sphincter tone and rigidity of the tissues of the lower rectum.  Anal manometry was recommended by Dr. Fairchild to further evaluate the anal rectal function and decision for either reversal of colostomy or revision of his colostomy due to retraction will be made at that time.  • 4/13/17 - CT scan of the chest which showed stable minimal biapical pleural parenchymal scarring.  Otherwise the lungs were clear.  There were no pathologically enlarged intrathoracic lymph nodes identified.  Hepatic steatosis.    • 6/20/17 - Patient had an  CHICHI which was negative.  Haptoglobin was normal at 97.  SPEP immunofixation did not reveal any monoclonal protein.  Retic count normal at 1.06.  Folate normal at 16.  .  BUN 6, creatinine 0.9.  Methylmalonic acid was normal at 139.  PT 4.8, INR 1, PTT 29.2, fibrinogen normal at 324.    • Patient had CT scan of the chest, abdomen and pelvis on 1/8/18.  There was no evidence of recurrent disease in the chest, abdomen or pelvis.   • 5/18/18 - Bone marrow aspiration and biopsy showed normocellular bone marrow with adequate trilineage hematopoiesis.  There was no evidence of myeloproliferative disease, myelodysplastic disease.   Flow cytometry was negative.  Cytogenetics was normal male karyotype.  Megakaryocytes are adequate in number without any cytologic atypia.      • 10/2/18 - Patient was seen by Dr. Britton who ordered a 2D echocardiogram on 10/8/18.  EF was noted to be 25%.    • 1/24/19 - CT scan of the chest, abdomen and pelvis which did not show any evidence of metastatic disease in the abdomen.  But there was a 1 cm contrast enhancing mass in the right lobe of the liver, which could represent a hemangioma, but metastatic disease cannot be completely excluded.  Dedicated MRI of the liver has been recommended to further evaluate.    • 3/1/19 - MRI of the abdomen:  Spoke with Dr. Nicolette Agosto, the radiologist.  There was a 7 mm of focus of enhancement in the dome of the liver, which is new compared to prior imaging.  Does not have the typical MR appearance of metastatic lesion, but due to the fact that it is new, is worrisome.  Also according to Dr. Agosto, its location would be very difficult for image guided biopsy and also too small to be characterized better on a PET scan.  Therefore, follow up MRI of the liver was recommended in about three to four months.  The lesion is very close to the diaphragm which makes it more difficult to approach.  Patient will be notified and a follow up MRI will be scheduled.    • 5/8/19 - Colonoscopy revealed no polyps or recurrent neoplasms seen.    • 5/30/19 - MRI of the abdomen which showed an enhancing lesion measuring 9 mm on the right hepatic lobe, unchanged from January 2019 CT.  Continued surveillance was recommended.  Of note this lesion is new from MRI of the abdomen from 2016.  Imaging characteristics are nonspecific.  • 6/3/19- CEA  1.3, bun 5, creat 1.1     Past Medical History:   Diagnosis Date   • Bradycardia     Hx of bradycardia-Abstracted from [facility].    • Pacemaker 05/2015   • Rectal cancer (CMS/HCC)        Past Surgical History:   Procedure Laterality Date   • COLECTOMY PARTIAL / TOTAL  06/22/2017    partial colectomy with takedown of colostomy/new colostomy-Dr. Fairchild-Abstracted from [facility].   • COLOSTOMY REVISION  06/22/2017    Dr. Fairchild-Abstracted from [facility].    • ILEOSTOMY  06/02/2016    ileostomy reversal-Dr. Fairchild-Abstracted from [facility].    • LOW ANTERIOR BOWEL RESECTION  08/07/2015    Ileostomy-[facility]ed from [facility]    • SIGMOIDOSCOPY  01/21/2017    with colostomy-Dr. Fairchild-Abstracted from [facility].    • VENOUS ACCESS DEVICE (PORT) INSERTION Right 05/2015    Subclavian infuse a port placement-Abstracted from [facility].    • VENOUS ACCESS DEVICE (PORT) REMOVAL  06/02/2016         Current Outpatient Medications:   •  aspirin (ASPIRIN ADULT LOW DOSE) 81 MG EC tablet, Daily., Disp: , Rfl:   •  lisinopril (PRINIVIL,ZESTRIL) 2.5 MG tablet, TAKE 1 TABLET BY MOUTH DAILY, Disp: 90 tablet, Rfl: 0  •  metoprolol succinate XL (TOPROL-XL) 25 MG 24 hr tablet, TAKE 1 TABLET BY MOUTH DAILY, Disp: 90 tablet, Rfl: 2  •  vitamin B-12 (CYANOCOBALAMIN) 1000 MCG tablet, Take 1,000 mcg by mouth Daily., Disp: , Rfl:     No Known Allergies    Family History   Problem Relation Age of Onset   • Stroke Mother    • Stroke Father    • Heart attack Sister    • Heart disease Other    • Hypertension Other        Cancer-related family history is not on file.    Social History     Tobacco Use   • Smoking status:  "Never Smoker   Substance Use Topics   • Alcohol use: No     Frequency: Never   • Drug use: No       I have reviewed the history of present illness, past medical history, family history, social history, lab results, all notes and other records since the patient was last seen on Visit date not found.    SUBJECTIVE:  The patient is here for a follow up appointment.  The patient is accompanied by his niece for this appointment.  The patient denies any rectal bleeding.  The patient states that he continues oral B12 supplementation.            REVIEW OF SYSTEMS:  Review of Systems   Constitutional: Negative for chills and fever.   HENT: Negative for ear pain, mouth sores, nosebleeds and sore throat.    Eyes: Negative for photophobia and visual disturbance.   Respiratory: Negative for wheezing and stridor.    Cardiovascular: Negative for chest pain and palpitations.   Gastrointestinal: Negative for abdominal pain, diarrhea, nausea and vomiting.   Endocrine: Negative for cold intolerance and heat intolerance.   Genitourinary: Negative for dysuria and hematuria.   Musculoskeletal: Negative for joint swelling and neck stiffness.   Skin: Negative for color change and rash.   Neurological: Negative for seizures and syncope.   Hematological: Negative for adenopathy.        No obvious bleeding   Psychiatric/Behavioral: Negative for agitation, confusion and hallucinations.       OBJECTIVE:    Vitals:    10/14/19 1003   BP: 121/80   Pulse: 72   Resp: 18   Temp: 97.7 °F (36.5 °C)   Weight: 62.5 kg (137 lb 12.8 oz)   Height: 170.2 cm (67\")   PainSc: 0-No pain       ECOG  (1) Restricted in physically strenuous activity, ambulatory and able to do work of light nature    Physical Exam   Constitutional: He is oriented to person, place, and time. No distress.   HENT:   Head: Normocephalic and atraumatic.   Eyes: Conjunctivae and EOM are normal. Right eye exhibits no discharge. Left eye exhibits no discharge. No scleral icterus.   Neck: " Normal range of motion. Neck supple. No thyromegaly present.   Cardiovascular: Normal rate, regular rhythm and normal heart sounds. Exam reveals no gallop and no friction rub.   Pulmonary/Chest: Effort normal. No stridor. No respiratory distress. He has no wheezes.   Abdominal: Soft. Bowel sounds are normal. He exhibits no mass. There is no tenderness. There is no rebound and no guarding.   Colostomy in place   Musculoskeletal: Normal range of motion. He exhibits no tenderness.   Lymphadenopathy:     He has no cervical adenopathy.   Neurological: He is alert and oriented to person, place, and time. He exhibits normal muscle tone.   Skin: Skin is warm. No rash noted. He is not diaphoretic. No erythema.   Psychiatric: He has a normal mood and affect. His behavior is normal.   Nursing note and vitals reviewed.      RECENT LABS  WBC   Date Value Ref Range Status   10/14/2019 6.49 3.40 - 10.80 10*3/mm3 Final     RBC   Date Value Ref Range Status   10/14/2019 4.60 4.14 - 5.80 10*6/mm3 Final     Hemoglobin   Date Value Ref Range Status   10/14/2019 13.8 13.0 - 17.7 g/dL Final     Hematocrit   Date Value Ref Range Status   10/14/2019 41.4 37.5 - 51.0 % Final     MCV   Date Value Ref Range Status   10/14/2019 90.0 79.0 - 97.0 fL Final     MCH   Date Value Ref Range Status   10/14/2019 30.0 26.6 - 33.0 pg Final     MCHC   Date Value Ref Range Status   10/14/2019 33.3 31.5 - 35.7 g/dL Final     RDW   Date Value Ref Range Status   10/14/2019 13.5 12.3 - 15.4 % Final     RDW-SD   Date Value Ref Range Status   10/14/2019 43.9 37.0 - 54.0 fl Final     MPV   Date Value Ref Range Status   10/14/2019 12.0 6.0 - 12.0 fL Final     Platelets   Date Value Ref Range Status   10/14/2019 172 140 - 450 10*3/mm3 Final     Neutrophil %   Date Value Ref Range Status   10/14/2019 77.0 (H) 42.7 - 76.0 % Final     Lymphocyte %   Date Value Ref Range Status   10/14/2019 11.4 (L) 19.6 - 45.3 % Final     Monocyte %   Date Value Ref Range Status    10/14/2019 9.1 5.0 - 12.0 % Final     Eosinophil %   Date Value Ref Range Status   10/14/2019 2.2 0.3 - 6.2 % Final     Basophil %   Date Value Ref Range Status   10/14/2019 0.3 0.0 - 1.5 % Final     Neutrophils, Absolute   Date Value Ref Range Status   10/14/2019 5.00 1.70 - 7.00 10*3/mm3 Final     Lymphocytes, Absolute   Date Value Ref Range Status   10/14/2019 0.74 0.70 - 3.10 10*3/mm3 Final     Monocytes, Absolute   Date Value Ref Range Status   10/14/2019 0.59 0.10 - 0.90 10*3/mm3 Final     Eosinophils, Absolute   Date Value Ref Range Status   10/14/2019 0.14 0.00 - 0.40 10*3/mm3 Final     Basophils, Absolute   Date Value Ref Range Status   10/14/2019 0.02 0.00 - 0.20 10*3/mm3 Final     nRBC   Date Value Ref Range Status   06/30/2017 0 0 /100[WBCs] Final       Lab Results   Component Value Date    GLUCOSE 77 06/03/2019    BUN 5 (L) 06/03/2019    CREATININE 1.1 06/03/2019    EGFRIFAFRI >60 05/30/2019    BCR 4.5 (L) 06/03/2019    K 4.0 06/03/2019    CO2 24 06/03/2019    CALCIUM 8.7 (L) 06/03/2019    ALBUMIN 4.0 06/03/2019    LABIL2 1.4 06/03/2019    AST 20 06/03/2019    ALT 13 (L) 06/03/2019         Assessment/Plan     History of rectal cancer  - CBC & Differential  - Comprehensive Metabolic Panel  - CBC Auto Differential    Liver lesion  - MRI Abdomen With & Without Contrast      ASSESSMENT:    1. Stage III rectal adenocarcinoma, status post combined chemotherapy and radiation with infusional 5-FU.  Residual malignancy is [id number].  Status post low anterior resection with low pelvic stapled anastomosis and diverting loop ileostomy.  Status post adjuvant chemotherapy with FOLFOX 6.   2. B12 deficiency, on oral B12 supplement.  3. Mild chemo-induced neuropathy.   4. Possible AV malformation in the liver.  MRI of the liver from August 2016 did not reveal any lesions.   5. Thrombocytopenia.  Status post bone marrow aspiration and biopsy with negative findings.  6. Status post ileostomy reversal.     7. Rectal  stricture.   8. Diverting colostomy due to chronic colonic obstruction from rectal stricture.    9. Cardiomyopathy with EF of 25% as of ECHO done October 2018.   10. 1 cm liver mass.  Radiologist recommends MRI of the liver with contrast.     11. MRI result will be difficult to further characterize on a PET/CT scan and is very close to diaphragm which makes it more difficult to perform a percutaneous biopsy.      PLAN:     Follow up MRI is due now. A CMP, CEA level will be drawn today. I will see him at that time. Patient will also follow up with the rest of his physicians for his ongoing medical problems. He has a history of B12 deficiency. Patient will transition to oral B12 supplementation.  I will check his levels periodically.  I explained the above to patient and his niece who is present today.           I have reviewed labs results, imaging, vitals, and medications with the patient today. Will follow up in 4 months with me.         Patient verbalized understanding and is in agreement of the above plan.    Part of this document was scribed by Lauren Montana RN, BSN.        Much of the above report is an electronic transcription/translation of the spoken language to printed text using Dragon Software. As such, the subtleties and finesse of the spoken language may permit erroneous, or at times, nonsensical words or phrases to be inadvertently transcribed; thus changes may be made at a later date to rectify these errors.

## 2019-10-30 ENCOUNTER — TELEPHONE (OUTPATIENT)
Dept: ONCOLOGY | Facility: CLINIC | Age: 59
End: 2019-10-30

## 2019-10-30 NOTE — TELEPHONE ENCOUNTER
Ms. Ordonez left message asking when Mr. Aguilar's next appt is.  Returned call, gave appt date/time for 2/10/20.

## 2019-11-07 ENCOUNTER — HOSPITAL ENCOUNTER (OUTPATIENT)
Dept: MRI IMAGING | Facility: HOSPITAL | Age: 59
Discharge: HOME | End: 2019-11-07
Admitting: NURSE PRACTITIONER

## 2019-11-07 DIAGNOSIS — K76.9 LIVER LESION: ICD-10-CM

## 2019-11-07 DIAGNOSIS — Z85.048 HISTORY OF RECTAL CANCER: ICD-10-CM

## 2019-11-07 PROCEDURE — 74183 MRI ABD W/O CNTR FLWD CNTR: CPT

## 2019-11-07 PROCEDURE — 25010000002 GADOTERIDOL PER 1 ML: Performed by: NURSE PRACTITIONER

## 2019-11-07 PROCEDURE — A9576 INJ PROHANCE MULTIPACK: HCPCS | Performed by: NURSE PRACTITIONER

## 2019-11-07 RX ADMIN — GADOTERIDOL 20 ML: 279.3 INJECTION, SOLUTION INTRAVENOUS at 15:15

## 2019-12-02 RX ORDER — LISINOPRIL 2.5 MG/1
2.5 TABLET ORAL DAILY
Qty: 90 TABLET | Refills: 0 | Status: SHIPPED | OUTPATIENT
Start: 2019-12-02 | End: 2020-02-25

## 2020-02-06 NOTE — PROGRESS NOTES
Hematology/Oncology Outpatient Follow Up    PATIENT NAME:Dave Aguilar  :1960  MRN: [id number]  PRIMARY CARE PHYSICIAN: Renato Foreman MD  REFERRING PHYSICIAN: Renato Foreman MD    Chief Complaint   Patient presents with   • Follow-up     Rectal cancer        HISTORY OF PRESENT ILLNESS:     This is a 59-year-old male who was diagnosed with stage III rectal adenocarcinoma.  Patient had presented with rectal bleeding while admitted to the hospital on 5/14/15.  At the time he had a colonoscopy that showed a rectal mass at 10 cm with near obstruction.  Patient did not have any clinical signs of colon obstruction.  • He had CT scan of the abdomen and pelvis done which revealed no evidence of liver involvement, but he had a rectal mass measuring 5.5 x 5.2 cm with wall thickening.  There were nonpathologically enlarged lymph nodes seen in the perirectal area.  One of the lymph nodes measured 5 mm.  He also had a CT scan of the chest which showed no acute abnormality within the chest.    • MRI of the pelvis was subsequently done and revealed rectal mass with viscerale tissue involvement and mildly enlarged perirectal lymphadenopathy.    • 5/8/15 - Pathology from rectal mass biopsy showed invasive, moderately differentiated adenocarcinoma, fragments of tubular adenoma.    • Due to significant amount of bleeding, patient had consultation with Dr. Whittaker who recommended a course of 9 Gy to be delivered in 3 fractions to control rectal bleeding and subsequently patient will be treated with daily XRT administered at 1.8 Gy per fraction for a total of 45 Gy along with concurrent chemotherapy.    • 5/9/15 - Serum CEA was measured at 10.   • 5/13/15 - Patient had permanent pacemaker implantation for syncope bradycardia secondary to sinus node dysfunction.   • Patient has been initiated on combined chemotherapy and radiation with continuous infusion 5-FU at 225 mg/M2 daily throughout the course of radiation  treatment.  His treatment was initiated on 5/18/15.    • 5/21/15 - PET/CT scan showed increased uptake in the rectum as expected with SUV of 11.98.  There was no evidence of local lymphadenopathy or hypermetabolic activity in the pelvis or inguinal nodes.  There was mild swirling of the distal mesentries which is totally asymptomatic.  No evidence of distant metastases was otherwise noted.    • 6/18/15 - Patient completed his neoadjuvant chemotherapy and radiation.    • 7/29/15 - Patient had an MRI of the abdomen and pelvis.  This revealed mass in the upper part of the rectum measuring 4.5 cm, smaller than on the prior exam.  The two perirectal nodes which were described originally appeared smaller.  The rest of his abdomen was unremarkable.    • 8/7/15 - Patient was taken to the operating room by Dr. Maria L Fairchild and underwent low anterior resection with low pelvic stapled anastomosis.  Mobilization of the splenic flexure and diverting loop ileostomy.  Pathology revealed moderately differentiated adenocarcinoma.  The tumor measured 3.5 cm.  The tumor was located above the peritoneal reflection.  There was no evidence of perforation.  There was no evidence of microsatellite instability.  Tumor invaded through muscularis propria into the subserosal adipose tissue.  There was no evidence of lymphovascular invasion, but there was evidence of perineural invasion.  All margins were negative.  Distance to the closest margin was 3 cm.  Pathologic stage is [id number].  Total of 23 lymph nodes were examined.  All were negative for metastatic carcinoma.    • 9/14/15 - Patient was initiated on cycle 1, day 1 of adjuvant FOLFOX 6.    • 10/26/15 - Treatment #4 of FOLFOX 6.  • 12/14/15 - Patient had cycle 7 of adjuvant FOLFOX.  • 2/8/16 - Patient had cycle 10 of FOLFOX 6.   • 3/7/16 - Patient received cycle 12 of FOLFOX 6.   • 3/17/16 - Patient was admitted to the hospital with syncopal episode.  Patient was found to be hypotensive in  the emergency room.  He was resuscitated with IV fluids.  He also had evidence of hepatic dysfunction with total bilirubin elevated to 5 and elevated liver function tests.    • 3/17/16 - During the hospitalization he had a CT scan of the abdomen and pelvis.  This revealed new 3.3 x 1.2 cm nonspecific fluid collection in the perirectal tissue.  There was also a 1.2 cm hypodense lesion in the right hepatic lobe.  MRI was recommended.  There was nonspecific thickening of the gallbladder wall and there was new mild hydronephrosis bilaterally.  Subsequently patient had an ultrasound of the abdomen which showed small amount of biliary sludge in the gallbladder, but no gallstones.  No definite findings to suggest cholecystitis.  CT scan of the head was negative for acute intracranial process.    • 3/18/16 - MRI of the abdomen with contrast revealed the area in the liver was atypical for colorectal metastases and this was thought to be most likely due to AV malformation.  Follow up MRI in three to six months was recommended.  The right hydronephrosis had resolved, but there was mild persistent hydronephrosis on the left.  • Patient was seen by Dr. Fairchild who was not too concerned about the perirectal fluid collection, but recommended antibiotics and rescanning in a few weeks to reevaluate.  Patient was also seen by Dr. Feldman with Urology service.  His bilateral hydronephrosis improved.  Throughout the hospitalization, patient also developed pancytopenia with leukopenia despite Neulasta.  During the course of his hospitalization he was noted to be hypotensive.  Patient was placed on Midodrine.    • 4/15/16 - CT scan of the abdomen and pelvis.  This basically showed persistent presacral soft tissue thickening with central fluid component.  Mild nonspecific thickening of the wall of the colon.    • 5/18/16 - PET/CT scan was essentially unremarkable.  No evidence of recurrent disease or increased activity in the abdomen or pelvis  to suggest metastatic disease.    • 6/2/16 - Colonoscopy and reversal of ileostomy.  The colonoscopy was essentially unremarkable.    • 6/17/16 - CT scan of the abdomen which showed evidence of post-op changes.  There was a 1.2 cm enhancing lesion in the posterior right hepatic lobe, stable in size.  Potential ileus.    • 8/17/16 - Patient had MRI of the liver which did not reveal any lesions in the liver.  No abnormal enhancing liver lesions to indicate metastatic disease were seen.  There was an abundant amount of stool throughout markedly dilated colon.  Patient has severe ileus with fecal impaction.  Patient has since then been seen by Dr. Fairchild.  He has another appointment scheduled for next week.    • 12/6/16 - CEA 1.8.  Chemistry panel:  BUN 4, creatinine 0.9.  • Patient was admitted to the hospital in January 2017.  At the time he underwent a diverting colostomy due to chronic rectal stricture resulting in colonic distention and small bowel distention.    • 1/19/17 - CT scan of the abdomen and pelvis which showed marked gaseous distention of the majority of the small bowel and entire colon.    • 3/21/17 - Patient had a colonoscopy per rectum and also stoma with dilatation of rectal stricture.  There were no masses identified.  There was mild narrowing, but no critical stricture of the rectum.  There was evidence of poor sphincter tone and rigidity of the tissues of the lower rectum.  Anal manometry was recommended by Dr. Fairchild to further evaluate the anal rectal function and decision for either reversal of colostomy or revision of his colostomy due to retraction will be made at that time.  • 4/13/17 - CT scan of the chest which showed stable minimal biapical pleural parenchymal scarring.  Otherwise the lungs were clear.  There were no pathologically enlarged intrathoracic lymph nodes identified.  Hepatic steatosis.    • 6/20/17 - Patient had an CHICHI which was negative.  Haptoglobin was normal at 97.  SPEP  immunofixation did not reveal any monoclonal protein.  Retic count normal at 1.06.  Folate normal at 16.  .  BUN 6, creatinine 0.9.  Methylmalonic acid was normal at 139.  PT 4.8, INR 1, PTT 29.2, fibrinogen normal at 324.    • Patient had CT scan of the chest, abdomen and pelvis on 1/8/18.  There was no evidence of recurrent disease in the chest, abdomen or pelvis.   • 5/18/18 - Bone marrow aspiration and biopsy showed normocellular bone marrow with adequate trilineage hematopoiesis.  There was no evidence of myeloproliferative disease, myelodysplastic disease.   Flow cytometry was negative.  Cytogenetics was normal male karyotype.  Megakaryocytes are adequate in number without any cytologic atypia.      • 10/2/18 - Patient was seen by Dr. Britton who ordered a 2D echocardiogram on 10/8/18.  EF was noted to be 25%.    • 1/24/19 - CT scan of the chest, abdomen and pelvis which did not show any evidence of metastatic disease in the abdomen.  But there was a 1 cm contrast enhancing mass in the right lobe of the liver, which could represent a hemangioma, but metastatic disease cannot be completely excluded.  Dedicated MRI of the liver has been recommended to further evaluate.    • 3/1/19 - MRI of the abdomen:  Spoke with Dr. Nicolette Agosto, the radiologist.  There was a 7 mm of focus of enhancement in the dome of the liver, which is new compared to prior imaging.  Does not have the typical MR appearance of metastatic lesion, but due to the fact that it is new, is worrisome.  Also according to Dr. Agosto, its location would be very difficult for image guided biopsy and also too small to be characterized better on a PET scan.  Therefore, follow up MRI of the liver was recommended in about three to four months.  The lesion is very close to the diaphragm which makes it more difficult to approach.  Patient will be notified and a follow up MRI will be scheduled.   • 5/8/19 - Colonoscopy revealed no polyps or recurrent  neoplasms seen.    • 5/30/19 - MRI of the abdomen which showed an enhancing lesion measuring 9 mm on the right hepatic lobe, unchanged from January 2019 CT.  Continued surveillance was recommended.  Of note this lesion is new from MRI of the abdomen from 2016.  Imaging characteristics are nonspecific.  • 6/3/19- CEA  1.3, bun 5, creat 1.1  • 11/7/2019-patient had MRI of the abdomen this showed a stable 9 mm lesion in the hepatic lobe.  It does not have typical enhancement characteristics of malignancy or metastatic disease.  Benign etiology such as hemangioma is favored follow-up in 6 to 12 months was recommended.    Past Medical History:   Diagnosis Date   • Bradycardia     Hx of bradycardia-[facility]ed from [facility]    • Pacemaker 05/2015   • Rectal cancer (CMS/HCC)        Past Surgical History:   Procedure Laterality Date   • COLECTOMY PARTIAL / TOTAL  06/22/2017    partial colectomy with takedown of colostomy/new colostomy-Dr. Fairchild-[facility]ed from [facility]   • COLOSTOMY REVISION  06/22/2017    Dr. Fairchild-Abstracted from [facility]    • ILEOSTOMY  06/02/2016    ileostomy reversal-Dr. Fairchild-[facility]ed from [facility]    • LOW ANTERIOR BOWEL RESECTION  08/07/2015    Ileostomy-Abstracted from [facility]    • SIGMOIDOSCOPY  01/21/2017    with colostomy-Dr. Fairchild-Abstracted from [facility]    • VENOUS ACCESS DEVICE (PORT) INSERTION Right 05/2015    Subclavian infuse a port placement-Abstracted from [facility]    • VENOUS ACCESS DEVICE (PORT) REMOVAL  06/02/2016         Current Outpatient Medications:   •  aspirin (ASPIRIN ADULT LOW DOSE) 81 MG EC tablet, Daily., Disp: , Rfl:   •  lisinopril (PRINIVIL,ZESTRIL) 2.5 MG tablet, TAKE 1 TABLET BY MOUTH DAILY, Disp: 90 tablet, Rfl: 0  •  metoprolol succinate XL (TOPROL-XL) 25 MG 24 hr tablet, TAKE 1 TABLET BY MOUTH DAILY, Disp: 90 tablet, Rfl: 2  •  vitamin B-12 (CYANOCOBALAMIN) 1000 MCG tablet, Take 1,000 mcg by mouth Daily., Disp: , Rfl:     No Known Allergies    Family History   Problem Relation Age of  "Onset   • Stroke Mother    • Stroke Father    • Heart attack Sister    • Heart disease Other    • Hypertension Other        Cancer-related family history is not on file.    Social History     Tobacco Use   • Smoking status: Never Smoker   • Smokeless tobacco: Never Used   Substance Use Topics   • Alcohol use: No     Frequency: Never   • Drug use: No       I have reviewed the history of present illness, past medical history, family history, social history, lab results, all notes and other records since the patient was last seen on Visit date not found.    SUBJECTIVE:    The patient is here for a follow up appointment.  The patient is accompanied by his niece for this appointment.  The patient denies any rectal bleeding and any new pain.          REVIEW OF SYSTEMS:  Review of Systems   Constitutional: Negative for chills and fever.   HENT: Negative for ear pain, mouth sores, nosebleeds and sore throat.    Eyes: Negative for photophobia and visual disturbance.   Respiratory: Negative for wheezing and stridor.    Cardiovascular: Negative for chest pain and palpitations.   Gastrointestinal: Negative for abdominal pain, diarrhea, nausea and vomiting.   Endocrine: Negative for cold intolerance and heat intolerance.   Genitourinary: Negative for dysuria and hematuria.   Musculoskeletal: Negative for joint swelling and neck stiffness.   Skin: Negative for color change and rash.   Neurological: Negative for seizures and syncope.   Hematological: Negative for adenopathy.        No obvious bleeding   Psychiatric/Behavioral: Negative for agitation, confusion and hallucinations.       OBJECTIVE:    Vitals:    02/10/20 1004   BP: 128/91   Pulse: 77   Resp: 18   Temp: 98.6 °F (37 °C)   Weight: 64.5 kg (142 lb 3.2 oz)   Height: 170.2 cm (67\")   PainSc: 0-No pain       ECOG  (1) Restricted in physically strenuous activity, ambulatory and able to do work of light nature    Physical Exam   Constitutional: He is oriented to person, " place, and time. No distress.   HENT:   Head: Normocephalic and atraumatic.   Eyes: Conjunctivae and EOM are normal. Right eye exhibits no discharge. Left eye exhibits no discharge. No scleral icterus.   Neck: Normal range of motion. Neck supple. No thyromegaly present.   Cardiovascular: Normal rate, regular rhythm and normal heart sounds. Exam reveals no gallop and no friction rub.   Pulmonary/Chest: Effort normal. No stridor. No respiratory distress. He has no wheezes.   Abdominal: Soft. Bowel sounds are normal. He exhibits no mass. There is no tenderness. There is no rebound and no guarding.   Colostomy in place   Musculoskeletal: Normal range of motion. He exhibits no tenderness.   Lymphadenopathy:     He has no cervical adenopathy.   Neurological: He is alert and oriented to person, place, and time. He exhibits normal muscle tone.   Skin: Skin is warm. No rash noted. He is not diaphoretic. No erythema.   Psychiatric: He has a normal mood and affect. His behavior is normal.   Nursing note and vitals reviewed.      RECENT LABS  WBC   Date Value Ref Range Status   02/10/2020 5.26 3.40 - 10.80 10*3/mm3 Final     RBC   Date Value Ref Range Status   02/10/2020 4.89 4.14 - 5.80 10*6/mm3 Final     Hemoglobin   Date Value Ref Range Status   02/10/2020 14.7 13.0 - 17.7 g/dL Final     Hematocrit   Date Value Ref Range Status   02/10/2020 42.9 37.5 - 51.0 % Final     MCV   Date Value Ref Range Status   02/10/2020 87.7 79.0 - 97.0 fL Final     MCH   Date Value Ref Range Status   02/10/2020 30.1 26.6 - 33.0 pg Final     MCHC   Date Value Ref Range Status   02/10/2020 34.3 31.5 - 35.7 g/dL Final     RDW   Date Value Ref Range Status   02/10/2020 13.5 12.3 - 15.4 % Final     RDW-SD   Date Value Ref Range Status   02/10/2020 42.5 37.0 - 54.0 fl Final     MPV   Date Value Ref Range Status   02/10/2020 12.3 (H) 6.0 - 12.0 fL Final     Platelets   Date Value Ref Range Status   02/10/2020 123 (L) 140 - 450 10*3/mm3 Final      Neutrophil %   Date Value Ref Range Status   02/10/2020 69.4 42.7 - 76.0 % Final     Lymphocyte %   Date Value Ref Range Status   02/10/2020 16.5 (L) 19.6 - 45.3 % Final     Monocyte %   Date Value Ref Range Status   02/10/2020 10.1 5.0 - 12.0 % Final     Eosinophil %   Date Value Ref Range Status   02/10/2020 3.4 0.3 - 6.2 % Final     Basophil %   Date Value Ref Range Status   02/10/2020 0.6 0.0 - 1.5 % Final     Neutrophils, Absolute   Date Value Ref Range Status   02/10/2020 3.65 1.70 - 7.00 10*3/mm3 Final     Lymphocytes, Absolute   Date Value Ref Range Status   02/10/2020 0.87 0.70 - 3.10 10*3/mm3 Final     Monocytes, Absolute   Date Value Ref Range Status   02/10/2020 0.53 0.10 - 0.90 10*3/mm3 Final     Eosinophils, Absolute   Date Value Ref Range Status   02/10/2020 0.18 0.00 - 0.40 10*3/mm3 Final     Basophils, Absolute   Date Value Ref Range Status   02/10/2020 0.03 0.00 - 0.20 10*3/mm3 Final     nRBC   Date Value Ref Range Status   06/30/2017 0 0 /100[WBCs] Final       Lab Results   Component Value Date    GLUCOSE 88 02/10/2020    BUN 10 02/10/2020    CREATININE 1.08 02/10/2020    EGFRIFNONA 70 02/10/2020    EGFRIFAFRI >60 05/30/2019    BCR 9.3 02/10/2020    K 4.5 02/10/2020    CO2 26.0 02/10/2020    CALCIUM 9.0 02/10/2020    ALBUMIN 4.10 02/10/2020    LABIL2 1.4 06/03/2019    AST 16 02/10/2020    ALT 12 02/10/2020         Assessment/Plan     History of rectal cancer  - CBC & Differential  - Comprehensive Metabolic Panel  - CEA  - CBC Auto Differential  - CT Abdomen Pelvis With Contrast  - CT Chest With Contrast      ASSESSMENT:    1. Stage III rectal adenocarcinoma, status post combined chemotherapy and radiation with infusional 5-FU.  Residual malignancy is [id number].  Status post low anterior resection with low pelvic stapled anastomosis and diverting loop ileostomy.  Status post adjuvant chemotherapy with FOLFOX 6.   2. B12 deficiency, on oral B12 supplement.  3. Mild chemo-induced neuropathy.    4. Possible AV malformation in the liver.  MRI of the liver from August 2016 did not reveal any lesions.   5. Thrombocytopenia.  Status post bone marrow aspiration and biopsy with negative findings.  6. Status post ileostomy reversal.     7. Rectal stricture.   8. Diverting colostomy due to chronic colonic obstruction from rectal stricture.    9. Cardiomyopathy with EF of 25% as of ECHO done October 2018.   10. 1 cm liver mass.  Radiologist recommends MRI of the liver with contrast.     11. MRI result will be difficult to further characterize on a PET/CT scan and is very close to diaphragm which makes it more difficult to perform a percutaneous biopsy.      PLAN:     CT C/A/P with contrast due now. A CMP, CEA level will be drawn today. Patient will also follow up with the rest of his physicians for his ongoing medical problems. He has a history of B12 deficiency. Patient will transition to oral B12 supplementation.  I will check his levels periodically.  I explained the above to patient and his niece who is present today.    We will schedule MRI of the liver at the next visit         I have reviewed labs results, imaging, vitals, and medications with the patient today. Will follow up in 6 months with me.      Patient verbalized understanding and is in agreement of the above plan.    Part of this document was scribed by Lauren Montana RN, BSN.  .    I spent greater than 30 minutes in face-to-face time with the patient and 95% of that time were spent in counseling and coordination of care, including review of imaging and pathology; indications for treatment, goals of therapy, alternatives and risks - both common and rare - as well as surveillance and potential outcomes.

## 2020-02-10 ENCOUNTER — OFFICE VISIT (OUTPATIENT)
Dept: ONCOLOGY | Facility: CLINIC | Age: 60
End: 2020-02-10

## 2020-02-10 ENCOUNTER — APPOINTMENT (OUTPATIENT)
Dept: LAB | Facility: HOSPITAL | Age: 60
End: 2020-02-10

## 2020-02-10 VITALS
SYSTOLIC BLOOD PRESSURE: 128 MMHG | RESPIRATION RATE: 18 BRPM | DIASTOLIC BLOOD PRESSURE: 91 MMHG | HEART RATE: 77 BPM | WEIGHT: 142.2 LBS | TEMPERATURE: 98.6 F | BODY MASS INDEX: 22.32 KG/M2 | HEIGHT: 67 IN

## 2020-02-10 DIAGNOSIS — Z85.048 HISTORY OF RECTAL CANCER: Primary | ICD-10-CM

## 2020-02-10 LAB
ALBUMIN SERPL-MCNC: 4.1 G/DL (ref 3.5–5.2)
ALBUMIN/GLOB SERPL: 1.4 G/DL
ALP SERPL-CCNC: 91 U/L (ref 39–117)
ALT SERPL W P-5'-P-CCNC: 12 U/L (ref 1–41)
ANION GAP SERPL CALCULATED.3IONS-SCNC: 13 MMOL/L (ref 5–15)
AST SERPL-CCNC: 16 U/L (ref 1–40)
BASOPHILS # BLD AUTO: 0.03 10*3/MM3 (ref 0–0.2)
BASOPHILS NFR BLD AUTO: 0.6 % (ref 0–1.5)
BILIRUB SERPL-MCNC: 0.7 MG/DL (ref 0.2–1.2)
BUN BLD-MCNC: 10 MG/DL (ref 8–23)
BUN/CREAT SERPL: 9.3 (ref 7–25)
CALCIUM SPEC-SCNC: 9 MG/DL (ref 8.6–10.5)
CEA SERPL-MCNC: 2.01 NG/ML
CHLORIDE SERPL-SCNC: 103 MMOL/L (ref 98–107)
CO2 SERPL-SCNC: 26 MMOL/L (ref 22–29)
CREAT BLD-MCNC: 1.08 MG/DL (ref 0.76–1.27)
DEPRECATED RDW RBC AUTO: 42.5 FL (ref 37–54)
EOSINOPHIL # BLD AUTO: 0.18 10*3/MM3 (ref 0–0.4)
EOSINOPHIL NFR BLD AUTO: 3.4 % (ref 0.3–6.2)
ERYTHROCYTE [DISTWIDTH] IN BLOOD BY AUTOMATED COUNT: 13.5 % (ref 12.3–15.4)
GFR SERPL CREATININE-BSD FRML MDRD: 70 ML/MIN/1.73
GLOBULIN UR ELPH-MCNC: 2.9 GM/DL
GLUCOSE BLD-MCNC: 88 MG/DL (ref 65–99)
HCT VFR BLD AUTO: 42.9 % (ref 37.5–51)
HGB BLD-MCNC: 14.7 G/DL (ref 13–17.7)
LYMPHOCYTES # BLD AUTO: 0.87 10*3/MM3 (ref 0.7–3.1)
LYMPHOCYTES NFR BLD AUTO: 16.5 % (ref 19.6–45.3)
MCH RBC QN AUTO: 30.1 PG (ref 26.6–33)
MCHC RBC AUTO-ENTMCNC: 34.3 G/DL (ref 31.5–35.7)
MCV RBC AUTO: 87.7 FL (ref 79–97)
MONOCYTES # BLD AUTO: 0.53 10*3/MM3 (ref 0.1–0.9)
MONOCYTES NFR BLD AUTO: 10.1 % (ref 5–12)
NEUTROPHILS # BLD AUTO: 3.65 10*3/MM3 (ref 1.7–7)
NEUTROPHILS NFR BLD AUTO: 69.4 % (ref 42.7–76)
PLATELET # BLD AUTO: 123 10*3/MM3 (ref 140–450)
PMV BLD AUTO: 12.3 FL (ref 6–12)
POTASSIUM BLD-SCNC: 4.5 MMOL/L (ref 3.5–5.2)
PROT SERPL-MCNC: 7 G/DL (ref 6–8.5)
RBC # BLD AUTO: 4.89 10*6/MM3 (ref 4.14–5.8)
SODIUM BLD-SCNC: 142 MMOL/L (ref 136–145)
WBC NRBC COR # BLD: 5.26 10*3/MM3 (ref 3.4–10.8)

## 2020-02-10 PROCEDURE — 99214 OFFICE O/P EST MOD 30 MIN: CPT | Performed by: INTERNAL MEDICINE

## 2020-02-10 PROCEDURE — 85025 COMPLETE CBC W/AUTO DIFF WBC: CPT | Performed by: INTERNAL MEDICINE

## 2020-02-10 PROCEDURE — 80053 COMPREHEN METABOLIC PANEL: CPT | Performed by: NURSE PRACTITIONER

## 2020-02-10 PROCEDURE — 82378 CARCINOEMBRYONIC ANTIGEN: CPT | Performed by: NURSE PRACTITIONER

## 2020-02-10 PROCEDURE — 36415 COLL VENOUS BLD VENIPUNCTURE: CPT | Performed by: INTERNAL MEDICINE

## 2020-02-14 ENCOUNTER — APPOINTMENT (OUTPATIENT)
Dept: CT IMAGING | Facility: HOSPITAL | Age: 60
End: 2020-02-14

## 2020-02-14 ENCOUNTER — HOSPITAL ENCOUNTER (OUTPATIENT)
Dept: CT IMAGING | Facility: HOSPITAL | Age: 60
Discharge: HOME | End: 2020-02-14
Admitting: NURSE PRACTITIONER

## 2020-02-14 DIAGNOSIS — Z85.048 HISTORY OF RECTAL CANCER: ICD-10-CM

## 2020-02-14 PROCEDURE — 74177 CT ABD & PELVIS W/CONTRAST: CPT

## 2020-02-14 PROCEDURE — 0 IOPAMIDOL PER 1 ML: Performed by: NURSE PRACTITIONER

## 2020-02-14 PROCEDURE — 71260 CT THORAX DX C+: CPT

## 2020-02-14 RX ADMIN — IOPAMIDOL 100 ML: 755 INJECTION, SOLUTION INTRAVENOUS at 13:00

## 2020-02-25 RX ORDER — LISINOPRIL 2.5 MG/1
2.5 TABLET ORAL DAILY
Qty: 90 TABLET | Refills: 0 | Status: SHIPPED | OUTPATIENT
Start: 2020-02-25 | End: 2020-04-06 | Stop reason: SDUPTHER

## 2020-03-25 ENCOUNTER — APPOINTMENT (OUTPATIENT)
Dept: LAB | Facility: HOSPITAL | Age: 60
End: 2020-03-25

## 2020-03-25 ENCOUNTER — TELEPHONE (OUTPATIENT)
Dept: ONCOLOGY | Facility: CLINIC | Age: 60
End: 2020-03-25

## 2020-03-25 DIAGNOSIS — N40.0 ENLARGED PROSTATE: Primary | ICD-10-CM

## 2020-03-25 DIAGNOSIS — R93.5 ABNORMAL ABDOMINAL CT SCAN: ICD-10-CM

## 2020-03-25 LAB
BILIRUB UR QL STRIP: NEGATIVE
CLARITY UR: CLEAR
COLOR UR: NORMAL
GLUCOSE UR STRIP-MCNC: NEGATIVE MG/DL
HGB UR QL STRIP.AUTO: NEGATIVE
KETONES UR QL STRIP: NEGATIVE
LEUKOCYTE ESTERASE UR QL STRIP.AUTO: NEGATIVE
NITRITE UR QL STRIP: NEGATIVE
PH UR STRIP.AUTO: 6.5 [PH] (ref 5–8)
PROT UR QL STRIP: NEGATIVE
SP GR UR STRIP: 1.01 (ref 1–1.03)
UROBILINOGEN UR QL STRIP: NORMAL

## 2020-03-25 PROCEDURE — 81003 URINALYSIS AUTO W/O SCOPE: CPT

## 2020-03-25 NOTE — TELEPHONE ENCOUNTER
I called the patient and spoke to him and his niece Penny. I informed them of the results and the referral to the Urology office. The patient is coming in today for the UA at the lab. Patient and his caregiver v/u.

## 2020-03-25 NOTE — TELEPHONE ENCOUNTER
----- Message from Veronica Villanueva MD sent at 3/24/2020  3:59 PM EDT -----  Please ask about voiding/urinary symptoms.  Check UA with C&[facility].  Refer to urology for possible bladder outlet obstruction for him possibly an enlarged prostate.  Otherwise rest of his imaging did not show any signs of metastatic disease.  Please notify patient and caregiver.

## 2020-03-30 RX ORDER — METOPROLOL SUCCINATE 25 MG/1
TABLET, EXTENDED RELEASE ORAL
Qty: 90 TABLET | Refills: 2 | Status: SHIPPED | OUTPATIENT
Start: 2020-03-30 | End: 2020-12-28

## 2020-04-06 ENCOUNTER — CLINICAL SUPPORT NO REQUIREMENTS (OUTPATIENT)
Dept: CARDIOLOGY | Facility: CLINIC | Age: 60
End: 2020-04-06

## 2020-04-06 ENCOUNTER — OFFICE VISIT (OUTPATIENT)
Dept: CARDIOLOGY | Facility: CLINIC | Age: 60
End: 2020-04-06

## 2020-04-06 VITALS
DIASTOLIC BLOOD PRESSURE: 112 MMHG | HEIGHT: 67 IN | BODY MASS INDEX: 22.29 KG/M2 | SYSTOLIC BLOOD PRESSURE: 183 MMHG | WEIGHT: 142 LBS | HEART RATE: 64 BPM

## 2020-04-06 DIAGNOSIS — R73.9 HYPERGLYCEMIA: ICD-10-CM

## 2020-04-06 DIAGNOSIS — I42.0 DILATED CARDIOMYOPATHY (HCC): ICD-10-CM

## 2020-04-06 DIAGNOSIS — R00.1 BRADYCARDIA: Primary | ICD-10-CM

## 2020-04-06 DIAGNOSIS — I10 ESSENTIAL HYPERTENSION: Primary | ICD-10-CM

## 2020-04-06 DIAGNOSIS — Z95.0 PACEMAKER: ICD-10-CM

## 2020-04-06 PROCEDURE — 99213 OFFICE O/P EST LOW 20 MIN: CPT | Performed by: INTERNAL MEDICINE

## 2020-04-06 RX ORDER — LISINOPRIL 20 MG/1
20 TABLET ORAL DAILY
Qty: 90 TABLET | Refills: 3 | Status: SHIPPED | OUTPATIENT
Start: 2020-04-06 | End: 2020-05-21 | Stop reason: SDUPTHER

## 2020-04-06 NOTE — PROGRESS NOTES
You have chosen to receive care through a telephone visit today. Do you consent to use a telephone visit for your medical care today? Yes  This visit has been rescheduled as a phone visit to comply with patient safety concerns in accordance with CDC recommendations. Total time of discussion was 11 minutes.      Subjective:     Encounter Date:04/06/2020      Patient ID: Dave Aguilar is a 59 y.o. male.    Chief Complaint : Follow-up for hypertension, permanent pacemaker  History of Present Illness      This is a 59-year-old white male with PMH of    # recurrent syncope  # severe bradycardia, status post MRI safe [employer] permanent pacemaker 05/13/2015  # blood loss anemia, colorectal adeno CA on chemo and XRT  # mildly elevated glucose  # Rectal cancer status post chemo August 2015     here for  follow-up.  Patient's visit was done on the telephone today because of coronavirus epidemic.  Denies SOA ,CP or  lightheadedness or dizziness. .    Patient's arterial blood pressure is 183/112, heart rate 64 bpm  .  Patient had an echocardiogram done showing cardiomyopathy.  Patient's labs from 3/25/2020 reveal UA without proteinuria.  CMP, CBC from 2/20/2020 normal       ASSESSMENT;  # CMP  # uncontrolled hypertension  #  abnormal EKG  #. Jay, syncope, S/P ppm  #. elevated blood glucose     PLAN;  Patient has  today a blood pressure of 183/112  We will continue beta-blockers and increase lisinopril to 20 mg daily  Advice patient to check blood pressure at home   advice patient to take baby aspirin  Follow-up in pacemaker clinic  We will check an echocardiogram  We will check labs  Reviewed abnormal labs and blood pressure with patient and counseled on importance of blood pressure control    Assessment:          Diagnosis Plan   1. Essential hypertension  Adult Transthoracic Echo Complete W/ Cont if Necessary Per Protocol    Comprehensive Metabolic Panel    BNP    Hemoglobin A1c   2. Dilated cardiomyopathy (CMS/HCC)   Adult Transthoracic Echo Complete W/ Cont if Necessary Per Protocol    Comprehensive Metabolic Panel    BNP    Hemoglobin A1c   3. Hyperglycemia  Adult Transthoracic Echo Complete W/ Cont if Necessary Per Protocol    Comprehensive Metabolic Panel    BNP    Hemoglobin A1c          Plan:         Past Medical History:  Past Medical History:   Diagnosis Date   • Bradycardia     Hx of bradycardia-[facility] from [facility]    • Pacemaker 05/2015   • Rectal cancer (CMS/HCC)      Past Surgical History:  Past Surgical History:   Procedure Laterality Date   • COLECTOMY PARTIAL / TOTAL  06/22/2017    partial colectomy with takedown of colostomy/new colostomy-Dr. Fairchild-Abstracted from [facility].   • COLOSTOMY REVISION  06/22/2017    Dr. Fairchild-Abstracted from [facility].    • ILEOSTOMY  06/02/2016    ileostomy reversal-Dr. Fairchild-Abstracted from [facility].    • LOW ANTERIOR BOWEL RESECTION  08/07/2015    Ileostomy-[facility]ed from [facility]    • SIGMOIDOSCOPY  01/21/2017    with colostomy-Dr. Fairchild-Abstracted from [facility].    • VENOUS ACCESS DEVICE (PORT) INSERTION Right 05/2015    Subclavian infuse a port placement-[facility]ed from [facility]    • VENOUS ACCESS DEVICE (PORT) REMOVAL  06/02/2016      Allergies:  No Known Allergies  Home Meds:  Current Meds:     Current Outpatient Medications:   •  aspirin (ASPIRIN ADULT LOW DOSE) 81 MG EC tablet, Daily., Disp: , Rfl:   •  lisinopril (PRINIVIL,ZESTRIL) 20 MG tablet, Take 1 tablet by mouth Daily., Disp: 90 tablet, Rfl: 3  •  metoprolol succinate XL (TOPROL-XL) 25 MG 24 hr tablet, TAKE 1 TABLET BY MOUTH DAILY, Disp: 90 tablet, Rfl: 2  Social History:   Social History     Tobacco Use   • Smoking status: Never Smoker   • Smokeless tobacco: Never Used   Substance Use Topics   • Alcohol use: No     Frequency: Never      Family History:  Family History   Problem Relation Age of Onset   • Stroke Mother    • Stroke Father    • Heart attack Sister    • Heart disease Other    • Hypertension Other         The following portions  "of the patient's history were reviewed and updated as appropriate: allergies, current medications, past family history, past medical history, past social history, past surgical history and problem list.      Review of Systems   Constitution: Negative for malaise/fatigue.   Cardiovascular: Negative for chest pain, leg swelling, palpitations and syncope.   Respiratory: Negative for shortness of breath.    Skin: Negative for rash.   Gastrointestinal: Negative for nausea and vomiting.   Neurological: Negative for dizziness, light-headedness and numbness.     Comprehensive review of systems were reviewed and all others review of systems were found to be negative other than HPI    Procedures       Objective:     Physical Exam  BP (!) 183/112   Pulse 64   Ht 170.2 cm (67\")   Wt 64.4 kg (142 lb)   BMI 22.24 kg/m²     Lab Reviewed:                  "

## 2020-04-08 ENCOUNTER — TELEPHONE (OUTPATIENT)
Dept: ONCOLOGY | Facility: CLINIC | Age: 60
End: 2020-04-08

## 2020-04-08 PROCEDURE — 93294 REM INTERROG EVL PM/LDLS PM: CPT | Performed by: INTERNAL MEDICINE

## 2020-04-08 PROCEDURE — 93296 REM INTERROG EVL PM/IDS: CPT | Performed by: INTERNAL MEDICINE

## 2020-04-08 NOTE — TELEPHONE ENCOUNTER
I received a message from [facility] stating that [facility] ([phone]) was needing a verbal order for his 2pc skin barrier and drainage pouch.  I called [facility] and I let them know that we do not prescribe this for the patient and that it would either be his surgeon Dr. Fairchild or a GI physician.  They stated that they would contact Dr. Fairchild's office.

## 2020-05-21 RX ORDER — LISINOPRIL 20 MG/1
20 TABLET ORAL DAILY
Qty: 90 TABLET | Refills: 3 | Status: SHIPPED | OUTPATIENT
Start: 2020-05-21 | End: 2021-04-05

## 2020-05-21 RX ORDER — LISINOPRIL 2.5 MG/1
2.5 TABLET ORAL DAILY
Qty: 90 TABLET | Refills: 3 | OUTPATIENT
Start: 2020-05-21

## 2020-06-02 ENCOUNTER — TELEPHONE (OUTPATIENT)
Dept: ONCOLOGY | Facility: CLINIC | Age: 60
End: 2020-06-02

## 2020-06-03 ENCOUNTER — TELEPHONE (OUTPATIENT)
Dept: ONCOLOGY | Facility: CLINIC | Age: 60
End: 2020-06-03

## 2020-06-03 NOTE — TELEPHONE ENCOUNTER
Call [facility] talk with somebody from billing that help answer phone stated she would the message someone to call me regarding his appointment. Also call his sister let know that I would call when I know elsie

## 2020-06-05 ENCOUNTER — TELEPHONE (OUTPATIENT)
Dept: ONCOLOGY | Facility: CLINIC | Age: 60
End: 2020-06-05

## 2020-06-05 NOTE — TELEPHONE ENCOUNTER
Made appointment for  cooper to see dr amaral at [facility] on 6/18/2020 at 3:00pm in [place] [facility] patient family know date and time

## 2020-07-09 ENCOUNTER — CLINICAL SUPPORT NO REQUIREMENTS (OUTPATIENT)
Dept: CARDIOLOGY | Facility: CLINIC | Age: 60
End: 2020-07-09

## 2020-07-09 DIAGNOSIS — R00.1 BRADYCARDIA, SINUS: ICD-10-CM

## 2020-07-09 DIAGNOSIS — Z95.0 PACEMAKER: Primary | ICD-10-CM

## 2020-07-09 PROCEDURE — 93296 REM INTERROG EVL PM/IDS: CPT | Performed by: INTERNAL MEDICINE

## 2020-07-09 PROCEDURE — 93294 REM INTERROG EVL PM/LDLS PM: CPT | Performed by: INTERNAL MEDICINE

## 2020-08-05 NOTE — PROGRESS NOTES
Hematology/Oncology Outpatient Follow Up    PATIENT NAME:Dave Aguilar  :1960  MRN: [id number]  PRIMARY CARE PHYSICIAN: Renato Foreman MD  REFERRING PHYSICIAN: Renato Foreman MD    Chief Complaint   Patient presents with   • Follow-up     history of rectal cancer        HISTORY OF PRESENT ILLNESS:     This is a 59-year-old male who was diagnosed with stage III rectal adenocarcinoma.  Patient had presented with rectal bleeding while admitted to the hospital on 5/14/15.  At the time he had a colonoscopy that showed a rectal mass at 10 cm with near obstruction.  Patient did not have any clinical signs of colon obstruction.  • He had CT scan of the abdomen and pelvis done which revealed no evidence of liver involvement, but he had a rectal mass measuring 5.5 x 5.2 cm with wall thickening.  There were nonpathologically enlarged lymph nodes seen in the perirectal area.  One of the lymph nodes measured 5 mm.  He also had a CT scan of the chest which showed no acute abnormality within the chest.    • MRI of the pelvis was subsequently done and revealed rectal mass with viscerale tissue involvement and mildly enlarged perirectal lymphadenopathy.    • 5/8/15 - Pathology from rectal mass biopsy showed invasive, moderately differentiated adenocarcinoma, fragments of tubular adenoma.    • Due to significant amount of bleeding, patient had consultation with Dr. Whittaker who recommended a course of 9 Gy to be delivered in 3 fractions to control rectal bleeding and subsequently patient will be treated with daily XRT administered at 1.8 Gy per fraction for a total of 45 Gy along with concurrent chemotherapy.    • 5/9/15 - Serum CEA was measured at 10.   • 5/13/15 - Patient had permanent pacemaker implantation for syncope bradycardia secondary to sinus node dysfunction.   • Patient has been initiated on combined chemotherapy and radiation with continuous infusion 5-FU at 225 mg/M2 daily throughout the course of  radiation treatment.  His treatment was initiated on 5/18/15.    • 5/21/15 - PET/CT scan showed increased uptake in the rectum as expected with SUV of 11.98.  There was no evidence of local lymphadenopathy or hypermetabolic activity in the pelvis or inguinal nodes.  There was mild swirling of the distal mesentries which is totally asymptomatic.  No evidence of distant metastases was otherwise noted.    • 6/18/15 - Patient completed his neoadjuvant chemotherapy and radiation.    • 7/29/15 - Patient had an MRI of the abdomen and pelvis.  This revealed mass in the upper part of the rectum measuring 4.5 cm, smaller than on the prior exam.  The two perirectal nodes which were described originally appeared smaller.  The rest of his abdomen was unremarkable.    • 8/7/15 - Patient was taken to the operating room by Dr. Maria L Fairchild and underwent low anterior resection with low pelvic stapled anastomosis.  Mobilization of the splenic flexure and diverting loop ileostomy.  Pathology revealed moderately differentiated adenocarcinoma.  The tumor measured 3.5 cm.  The tumor was located above the peritoneal reflection.  There was no evidence of perforation.  There was no evidence of microsatellite instability.  Tumor invaded through muscularis propria into the subserosal adipose tissue.  There was no evidence of lymphovascular invasion, but there was evidence of perineural invasion.  All margins were negative.  Distance to the closest margin was 3 cm.  Pathologic stage is [id number].  Total of 23 lymph nodes were examined.  All were negative for metastatic carcinoma.    • 9/14/15 - Patient was initiated on cycle 1, day 1 of adjuvant FOLFOX 6.    • 10/26/15 - Treatment #4 of FOLFOX 6.  • 12/14/15 - Patient had cycle 7 of adjuvant FOLFOX.  • 2/8/16 - Patient had cycle 10 of FOLFOX 6.   • 3/7/16 - Patient received cycle 12 of FOLFOX 6.   • 3/17/16 - Patient was admitted to the hospital with syncopal episode.  Patient was found to be  hypotensive in the emergency room.  He was resuscitated with IV fluids.  He also had evidence of hepatic dysfunction with total bilirubin elevated to 5 and elevated liver function tests.    • 3/17/16 - During the hospitalization he had a CT scan of the abdomen and pelvis.  This revealed new 3.3 x 1.2 cm nonspecific fluid collection in the perirectal tissue.  There was also a 1.2 cm hypodense lesion in the right hepatic lobe.  MRI was recommended.  There was nonspecific thickening of the gallbladder wall and there was new mild hydronephrosis bilaterally.  Subsequently patient had an ultrasound of the abdomen which showed small amount of biliary sludge in the gallbladder, but no gallstones.  No definite findings to suggest cholecystitis.  CT scan of the head was negative for acute intracranial process.    • 3/18/16 - MRI of the abdomen with contrast revealed the area in the liver was atypical for colorectal metastases and this was thought to be most likely due to AV malformation.  Follow up MRI in three to six months was recommended.  The right hydronephrosis had resolved, but there was mild persistent hydronephrosis on the left.  • Patient was seen by Dr. Fairchild who was not too concerned about the perirectal fluid collection, but recommended antibiotics and rescanning in a few weeks to reevaluate.  Patient was also seen by Dr. Feldman with Urology service.  His bilateral hydronephrosis improved.  Throughout the hospitalization, patient also developed pancytopenia with leukopenia despite Neulasta.  During the course of his hospitalization he was noted to be hypotensive.  Patient was placed on Midodrine.    • 4/15/16 - CT scan of the abdomen and pelvis.  This basically showed persistent presacral soft tissue thickening with central fluid component.  Mild nonspecific thickening of the wall of the colon.    • 5/18/16 - PET/CT scan was essentially unremarkable.  No evidence of recurrent disease or increased activity in the  abdomen or pelvis to suggest metastatic disease.    • 6/2/16 - Colonoscopy and reversal of ileostomy.  The colonoscopy was essentially unremarkable.    • 6/17/16 - CT scan of the abdomen which showed evidence of post-op changes.  There was a 1.2 cm enhancing lesion in the posterior right hepatic lobe, stable in size.  Potential ileus.    • 8/17/16 - Patient had MRI of the liver which did not reveal any lesions in the liver.  No abnormal enhancing liver lesions to indicate metastatic disease were seen.  There was an abundant amount of stool throughout markedly dilated colon.  Patient has severe ileus with fecal impaction.  Patient has since then been seen by Dr. Fairchild.  He has another appointment scheduled for next week.    • 12/6/16 - CEA 1.8.  Chemistry panel:  BUN 4, creatinine 0.9.  • Patient was admitted to the hospital in January 2017.  At the time he underwent a diverting colostomy due to chronic rectal stricture resulting in colonic distention and small bowel distention.    • 1/19/17 - CT scan of the abdomen and pelvis which showed marked gaseous distention of the majority of the small bowel and entire colon.    • 3/21/17 - Patient had a colonoscopy per rectum and also stoma with dilatation of rectal stricture.  There were no masses identified.  There was mild narrowing, but no critical stricture of the rectum.  There was evidence of poor sphincter tone and rigidity of the tissues of the lower rectum.  Anal manometry was recommended by Dr. Fairchild to further evaluate the anal rectal function and decision for either reversal of colostomy or revision of his colostomy due to retraction will be made at that time.  • 4/13/17 - CT scan of the chest which showed stable minimal biapical pleural parenchymal scarring.  Otherwise the lungs were clear.  There were no pathologically enlarged intrathoracic lymph nodes identified.  Hepatic steatosis.    • 6/20/17 - Patient had an CHICHI which was negative.  Haptoglobin was normal  at 97.  SPEP immunofixation did not reveal any monoclonal protein.  Retic count normal at 1.06.  Folate normal at 16.  .  BUN 6, creatinine 0.9.  Methylmalonic acid was normal at 139.  PT 4.8, INR 1, PTT 29.2, fibrinogen normal at 324.    • Patient had CT scan of the chest, abdomen and pelvis on 1/8/18.  There was no evidence of recurrent disease in the chest, abdomen or pelvis.   • 5/18/18 - Bone marrow aspiration and biopsy showed normocellular bone marrow with adequate trilineage hematopoiesis.  There was no evidence of myeloproliferative disease, myelodysplastic disease.   Flow cytometry was negative.  Cytogenetics was normal male karyotype.  Megakaryocytes are adequate in number without any cytologic atypia.      • 10/2/18 - Patient was seen by Dr. Britton who ordered a 2D echocardiogram on 10/8/18.  EF was noted to be 25%.    • 1/24/19 - CT scan of the chest, abdomen and pelvis which did not show any evidence of metastatic disease in the abdomen.  But there was a 1 cm contrast enhancing mass in the right lobe of the liver, which could represent a hemangioma, but metastatic disease cannot be completely excluded.  Dedicated MRI of the liver has been recommended to further evaluate.    • 3/1/19 - MRI of the abdomen:  Spoke with Dr. Nicolette Agosto, the radiologist.  There was a 7 mm of focus of enhancement in the dome of the liver, which is new compared to prior imaging.  Does not have the typical MR appearance of metastatic lesion, but due to the fact that it is new, is worrisome.  Also according to Dr. Agosto, its location would be very difficult for image guided biopsy and also too small to be characterized better on a PET scan.  Therefore, follow up MRI of the liver was recommended in about three to four months.  The lesion is very close to the diaphragm which makes it more difficult to approach.  Patient will be notified and a follow up MRI will be scheduled.   • 5/8/19 - Colonoscopy revealed no polyps or  recurrent neoplasms seen.    • 5/30/19 - MRI of the abdomen which showed an enhancing lesion measuring 9 mm on the right hepatic lobe, unchanged from January 2019 CT.  Continued surveillance was recommended.  Of note this lesion is new from MRI of the abdomen from 2016.  Imaging characteristics are nonspecific.  • 6/3/19- CEA  1.3, bun 5, creat 1.1  • 11/7/2019-patient had MRI of the abdomen this showed a stable 9 mm lesion in the hepatic lobe.  It does not have typical enhancement characteristics of malignancy or metastatic disease.  Benign etiology such as hemangioma is favored follow-up in 6 to 12 months was recommended.  • 2/14/2020 patient had CT scan of the chest, abdomen and pelvis, there was no clear evidence of metastatic disease.  Stable 12 mm right liver lesion felt to represent a benign liver lesion.  There is thickening of the bladder wall and postop changes.    Past Medical History:   Diagnosis Date   • Bradycardia     Hx of bradycardia-Abstracted from [facility]    • Pacemaker 05/2015   • Rectal cancer (CMS/HCC)        Past Surgical History:   Procedure Laterality Date   • COLECTOMY PARTIAL / TOTAL  06/22/2017    partial colectomy with takedown of colostomy/new colostomy-Dr. Fairchild-[facility]ed from [facility]   • COLOSTOMY REVISION  06/22/2017    Dr. Fairchild-Abstracted from [facility]    • ILEOSTOMY  06/02/2016    ileostomy reversal-Dr. Fairchild-[facility]ed from [facility]    • LOW ANTERIOR BOWEL RESECTION  08/07/2015    Ileostomy-Abstracted from [facility]    • SIGMOIDOSCOPY  01/21/2017    with colostomy-Dr. Fairchild-Abstracted from [facility]    • VENOUS ACCESS DEVICE (PORT) INSERTION Right 05/2015    Subclavian infuse a port placement-Abstracted from [facility]    • VENOUS ACCESS DEVICE (PORT) REMOVAL  06/02/2016         Current Outpatient Medications:   •  aspirin (ASPIRIN ADULT LOW DOSE) 81 MG EC tablet, Daily., Disp: , Rfl:   •  lisinopril (PRINIVIL,ZESTRIL) 20 MG tablet, Take 1 tablet by mouth Daily., Disp: 90 tablet, Rfl: 3  •  metoprolol  "succinate XL (TOPROL-XL) 25 MG 24 hr tablet, TAKE 1 TABLET BY MOUTH DAILY, Disp: 90 tablet, Rfl: 2    No Known Allergies    Family History   Problem Relation Age of Onset   • Stroke Mother    • Stroke Father    • Heart attack Sister    • Heart disease Other    • Hypertension Other        Cancer-related family history is not on file.    Social History     Tobacco Use   • Smoking status: Never Smoker   • Smokeless tobacco: Never Used   Substance Use Topics   • Alcohol use: No     Frequency: Never   • Drug use: No       I have reviewed and confirmed the accuracy of the patient's history: Chief complaint, HPI and ROS as entered by the MA/LPN/RN. Veronica Villanueva MD 08/10/20       SUBJECTIVE:    The patient is here for a follow up appointment.  Patient does not have any specific complaints today.  He denies fevers, chills, nausea or vomiting.  He also denies any changes to his bowel habits.        REVIEW OF SYSTEMS:  Review of Systems   Constitutional: Negative for chills and fever.   HENT: Negative for ear pain, mouth sores, nosebleeds and sore throat.    Eyes: Negative for photophobia and visual disturbance.   Respiratory: Negative for wheezing and stridor.    Cardiovascular: Negative for chest pain and palpitations.   Gastrointestinal: Negative for abdominal pain, diarrhea, nausea and vomiting.   Endocrine: Negative for cold intolerance and heat intolerance.   Genitourinary: Negative for dysuria and hematuria.   Musculoskeletal: Negative for joint swelling and neck stiffness.   Skin: Negative for color change and rash.   Neurological: Negative for seizures and syncope.   Hematological: Negative for adenopathy.        No obvious bleeding   Psychiatric/Behavioral: Negative for agitation, confusion and hallucinations.     ROS as documented above    OBJECTIVE:    Vitals:    08/10/20 0936   BP: 155/92   Pulse: 77   Resp: 18   Temp: 97.7 °F (36.5 °C)   TempSrc: Oral   Weight: 62.1 kg (137 lb)   Height: 170.2 cm (67\") "   PainSc: 0-No pain       ECOG  (1) Restricted in physically strenuous activity, ambulatory and able to do work of light nature    Physical Exam   Constitutional: He is oriented to person, place, and time. No distress.   HENT:   Head: Normocephalic and atraumatic.   Eyes: Conjunctivae and EOM are normal. Right eye exhibits no discharge. Left eye exhibits no discharge. No scleral icterus.   Neck: Normal range of motion. Neck supple. No thyromegaly present.   Cardiovascular: Normal rate, regular rhythm and normal heart sounds. Exam reveals no gallop and no friction rub.   Pulmonary/Chest: Effort normal. No stridor. No respiratory distress. He has no wheezes.   Abdominal: Soft. Bowel sounds are normal. He exhibits no mass. There is no tenderness. There is no rebound and no guarding.   Colostomy in place   Musculoskeletal: Normal range of motion. He exhibits no tenderness.   Lymphadenopathy:     He has no cervical adenopathy.   Neurological: He is alert and oriented to person, place, and time. He exhibits normal muscle tone.   Skin: Skin is warm. No rash noted. He is not diaphoretic. No erythema.   Psychiatric: He has a normal mood and affect. His behavior is normal.   Nursing note and vitals reviewed.    Physical exam as documented above      RECENT LABS    WBC   Date Value Ref Range Status   08/10/2020 5.13 3.40 - 10.80 10*3/mm3 Final     RBC   Date Value Ref Range Status   08/10/2020 4.83 4.14 - 5.80 10*6/mm3 Final     Hemoglobin   Date Value Ref Range Status   08/10/2020 14.7 13.0 - 17.7 g/dL Final     Hematocrit   Date Value Ref Range Status   08/10/2020 43.6 37.5 - 51.0 % Final     MCV   Date Value Ref Range Status   08/10/2020 90.3 79.0 - 97.0 fL Final     MCH   Date Value Ref Range Status   08/10/2020 30.4 26.6 - 33.0 pg Final     MCHC   Date Value Ref Range Status   08/10/2020 33.7 31.5 - 35.7 g/dL Final     RDW   Date Value Ref Range Status   08/10/2020 14.4 12.3 - 15.4 % Final     RDW-SD   Date Value Ref  Range Status   08/10/2020 46.5 37.0 - 54.0 fl Final     MPV   Date Value Ref Range Status   08/10/2020 11.7 6.0 - 12.0 fL Final     Platelets   Date Value Ref Range Status   08/10/2020 129 (L) 140 - 450 10*3/mm3 Final     Neutrophil %   Date Value Ref Range Status   08/10/2020 69.3 42.7 - 76.0 % Final     Lymphocyte %   Date Value Ref Range Status   08/10/2020 17.9 (L) 19.6 - 45.3 % Final     Monocyte %   Date Value Ref Range Status   08/10/2020 10.1 5.0 - 12.0 % Final     Eosinophil %   Date Value Ref Range Status   08/10/2020 2.1 0.3 - 6.2 % Final     Basophil %   Date Value Ref Range Status   08/10/2020 0.6 0.0 - 1.5 % Final     Neutrophils, Absolute   Date Value Ref Range Status   08/10/2020 3.55 1.70 - 7.00 10*3/mm3 Final     Lymphocytes, Absolute   Date Value Ref Range Status   08/10/2020 0.92 0.70 - 3.10 10*3/mm3 Final     Monocytes, Absolute   Date Value Ref Range Status   08/10/2020 0.52 0.10 - 0.90 10*3/mm3 Final     Eosinophils, Absolute   Date Value Ref Range Status   08/10/2020 0.11 0.00 - 0.40 10*3/mm3 Final     Basophils, Absolute   Date Value Ref Range Status   08/10/2020 0.03 0.00 - 0.20 10*3/mm3 Final     nRBC   Date Value Ref Range Status   06/30/2017 0 0 /100[WBCs] Final       Lab Results   Component Value Date    GLUCOSE 107 (H) 08/10/2020    BUN  08/10/2020      Comment:      Testing performed by alternate method    CREATININE 1.09 08/10/2020    EGFRIFNONA 69 08/10/2020    EGFRIFAFRI >60 05/30/2019    BCR  08/10/2020      Comment:      Testing not performed    K 4.8 08/10/2020    CO2 27.0 08/10/2020    CALCIUM 9.3 08/10/2020    ALBUMIN 4.50 08/10/2020    LABIL2 1.4 06/03/2019    AST 19 08/10/2020    ALT 11 08/10/2020         Assessment/Plan     Abnormal abdominal CT scan  - CBC & Differential  - Comprehensive Metabolic Panel  - CEA  - CT chest w contrast  - CT abdomen pelvis w contrast  - Comprehensive Metabolic Panel  - CEA  - BUN  - BUN    History of rectal cancer  - CBC & Differential  -  Comprehensive Metabolic Panel  - CEA  - CT chest w contrast  - CT abdomen pelvis w contrast  - Comprehensive Metabolic Panel  - CEA  - BUN  - BUN    Liver lesion  - CBC & Differential  - Comprehensive Metabolic Panel  - CEA  - CT chest w contrast  - CT abdomen pelvis w contrast  - Comprehensive Metabolic Panel  - CEA  - BUN  - BUN      ASSESSMENT:    1. Stage III rectal adenocarcinoma, status post combined chemotherapy and radiation with infusional 5-FU.  Residual malignancy is [id number].  Status post low anterior resection with low pelvic stapled anastomosis and diverting loop ileostomy.  Status post adjuvant chemotherapy with FOLFOX 6.  On surveillance for relapse disease  2. B12 deficiency, on oral B12 supplement.  3. Mild chemo-induced neuropathy.   4. Possible AV malformation in the liver.  MRI of the liver from August 2016 did not reveal any lesions.  Surveillance imaging  5. Thrombocytopenia.  Status post bone marrow aspiration and biopsy with negative findings.  Platelets remained stable  6. Status post ileostomy reversal.     7. Rectal stricture.   8. Diverting colostomy due to chronic colonic obstruction from rectal stricture.    9. Cardiomyopathy with EF of 30 to 35% on ECHO done 8/7/2020.     10. MRI result will be difficult to further characterize on a PET/CT scan and is very close to diaphragm which makes it more difficult to perform a percutaneous biopsy.      PLANS:     CT C/A/P with contrast due now.  Will be ordered today.    A CMP, CEA level will be drawn today. Patient will also follow up with the rest of his physicians for his ongoing medical problems. He has a history of B12 deficiency. Patient will transition to oral B12 supplementation.  I will check his levels periodically.  I explained the above to patient and his niece who is present today.     Patient to follow-up with Dr. Britton  his cardiologist for cardiomyopathy           I have reviewed labs results, imaging, vitals, and medications  with the patient today. Will follow up in 6 months with me.      Patient verbalized understanding and is in agreement of the above plan.

## 2020-08-07 ENCOUNTER — HOSPITAL ENCOUNTER (OUTPATIENT)
Dept: CARDIOLOGY | Facility: HOSPITAL | Age: 60
Discharge: HOME | End: 2020-08-07
Admitting: INTERNAL MEDICINE

## 2020-08-07 VITALS
WEIGHT: 130 LBS | DIASTOLIC BLOOD PRESSURE: 74 MMHG | BODY MASS INDEX: 20.4 KG/M2 | HEIGHT: 67 IN | SYSTOLIC BLOOD PRESSURE: 126 MMHG

## 2020-08-07 DIAGNOSIS — R73.9 HYPERGLYCEMIA: ICD-10-CM

## 2020-08-07 DIAGNOSIS — I42.0 DILATED CARDIOMYOPATHY (HCC): ICD-10-CM

## 2020-08-07 DIAGNOSIS — I10 ESSENTIAL HYPERTENSION: ICD-10-CM

## 2020-08-07 LAB
BH CV ECHO MEAS - ACS: 2 CM
BH CV ECHO MEAS - AO MAX PG (FULL): 3.8 MMHG
BH CV ECHO MEAS - AO MAX PG: 5.7 MMHG
BH CV ECHO MEAS - AO MEAN PG (FULL): 2.5 MMHG
BH CV ECHO MEAS - AO MEAN PG: 3.6 MMHG
BH CV ECHO MEAS - AO ROOT AREA (BSA CORRECTED): 2.2
BH CV ECHO MEAS - AO ROOT AREA: 10.3 CM^2
BH CV ECHO MEAS - AO ROOT DIAM: 3.6 CM
BH CV ECHO MEAS - AO V2 MAX: 119 CM/SEC
BH CV ECHO MEAS - AO V2 MEAN: 90.5 CM/SEC
BH CV ECHO MEAS - AO V2 VTI: 25.4 CM
BH CV ECHO MEAS - ASC AORTA: 3.1 CM
BH CV ECHO MEAS - AVA(I,A): 2 CM^2
BH CV ECHO MEAS - AVA(I,D): 2 CM^2
BH CV ECHO MEAS - AVA(V,A): 2 CM^2
BH CV ECHO MEAS - AVA(V,D): 2 CM^2
BH CV ECHO MEAS - BSA(HAYCOCK): 1.7 M^2
BH CV ECHO MEAS - BSA: 1.7 M^2
BH CV ECHO MEAS - BZI_BMI: 20.4 KILOGRAMS/M^2
BH CV ECHO MEAS - BZI_METRIC_HEIGHT: 170.2 CM
BH CV ECHO MEAS - BZI_METRIC_WEIGHT: 59 KG
BH CV ECHO MEAS - EDV(CUBED): 183.2 ML
BH CV ECHO MEAS - EDV(MOD-SP4): 103.7 ML
BH CV ECHO MEAS - EDV(TEICH): 158.7 ML
BH CV ECHO MEAS - EF(CUBED): 45 %
BH CV ECHO MEAS - EF(MOD-SP4): 34.5 %
BH CV ECHO MEAS - EF(TEICH): 37 %
BH CV ECHO MEAS - ESV(CUBED): 100.7 ML
BH CV ECHO MEAS - ESV(MOD-SP4): 67.9 ML
BH CV ECHO MEAS - ESV(TEICH): 99.9 ML
BH CV ECHO MEAS - FS: 18.1 %
BH CV ECHO MEAS - IVS/LVPW: 1.4
BH CV ECHO MEAS - IVSD: 0.92 CM
BH CV ECHO MEAS - LA DIMENSION: 2.8 CM
BH CV ECHO MEAS - LA/AO: 0.78
BH CV ECHO MEAS - LV DIASTOLIC VOL/BSA (35-75): 61.6 ML/M^2
BH CV ECHO MEAS - LV MASS(C)D: 165.8 GRAMS
BH CV ECHO MEAS - LV MASS(C)DI: 98.5 GRAMS/M^2
BH CV ECHO MEAS - LV MAX PG: 1.9 MMHG
BH CV ECHO MEAS - LV MEAN PG: 1 MMHG
BH CV ECHO MEAS - LV SYSTOLIC VOL/BSA (12-30): 40.3 ML/M^2
BH CV ECHO MEAS - LV V1 MAX: 68.9 CM/SEC
BH CV ECHO MEAS - LV V1 MEAN: 48.7 CM/SEC
BH CV ECHO MEAS - LV V1 VTI: 14.7 CM
BH CV ECHO MEAS - LVIDD: 5.7 CM
BH CV ECHO MEAS - LVIDS: 4.7 CM
BH CV ECHO MEAS - LVOT AREA: 3.4 CM^2
BH CV ECHO MEAS - LVOT DIAM: 2.1 CM
BH CV ECHO MEAS - LVPWD: 0.66 CM
BH CV ECHO MEAS - MV A MAX VEL: 95.5 CM/SEC
BH CV ECHO MEAS - MV DEC SLOPE: 161 CM/SEC^2
BH CV ECHO MEAS - MV DEC TIME: 0.35 SEC
BH CV ECHO MEAS - MV E MAX VEL: 56.2 CM/SEC
BH CV ECHO MEAS - MV E/A: 0.59
BH CV ECHO MEAS - MV MAX PG: 4.3 MMHG
BH CV ECHO MEAS - MV MEAN PG: 1.7 MMHG
BH CV ECHO MEAS - MV V2 MAX: 103.7 CM/SEC
BH CV ECHO MEAS - MV V2 MEAN: 60.5 CM/SEC
BH CV ECHO MEAS - MV V2 VTI: 25.6 CM
BH CV ECHO MEAS - MVA(VTI): 1.9 CM^2
BH CV ECHO MEAS - PA ACC TIME: 0.15 SEC
BH CV ECHO MEAS - PA MAX PG (FULL): 1.1 MMHG
BH CV ECHO MEAS - PA MAX PG: 2.1 MMHG
BH CV ECHO MEAS - PA PR(ACCEL): 10.5 MMHG
BH CV ECHO MEAS - PA V2 MAX: 72 CM/SEC
BH CV ECHO MEAS - PI MAX PG: 10.6 MMHG
BH CV ECHO MEAS - PI MAX VEL: 162.7 CM/SEC
BH CV ECHO MEAS - RAP SYSTOLE: 3 MMHG
BH CV ECHO MEAS - RV MAX PG: 0.94 MMHG
BH CV ECHO MEAS - RV MEAN PG: 0.51 MMHG
BH CV ECHO MEAS - RV V1 MAX: 48.4 CM/SEC
BH CV ECHO MEAS - RV V1 MEAN: 34.2 CM/SEC
BH CV ECHO MEAS - RV V1 VTI: 9.3 CM
BH CV ECHO MEAS - RVDD: 2.7 CM
BH CV ECHO MEAS - RVSP: 36 MMHG
BH CV ECHO MEAS - SI(AO): 155.6 ML/M^2
BH CV ECHO MEAS - SI(CUBED): 49 ML/M^2
BH CV ECHO MEAS - SI(LVOT): 29.5 ML/M^2
BH CV ECHO MEAS - SI(MOD-SP4): 21.2 ML/M^2
BH CV ECHO MEAS - SI(TEICH): 34.9 ML/M^2
BH CV ECHO MEAS - SV(AO): 262 ML
BH CV ECHO MEAS - SV(CUBED): 82.5 ML
BH CV ECHO MEAS - SV(LVOT): 49.7 ML
BH CV ECHO MEAS - SV(MOD-SP4): 35.7 ML
BH CV ECHO MEAS - SV(TEICH): 58.8 ML
BH CV ECHO MEAS - TR MAX VEL: 287 CM/SEC
MAXIMAL PREDICTED HEART RATE: 158 BPM
STRESS TARGET HR: 134 BPM

## 2020-08-07 PROCEDURE — 93306 TTE W/DOPPLER COMPLETE: CPT | Performed by: INTERNAL MEDICINE

## 2020-08-07 PROCEDURE — 93306 TTE W/DOPPLER COMPLETE: CPT

## 2020-08-10 ENCOUNTER — OFFICE VISIT (OUTPATIENT)
Dept: ONCOLOGY | Facility: CLINIC | Age: 60
End: 2020-08-10

## 2020-08-10 ENCOUNTER — LAB (OUTPATIENT)
Dept: LAB | Facility: HOSPITAL | Age: 60
End: 2020-08-10

## 2020-08-10 VITALS
WEIGHT: 137 LBS | BODY MASS INDEX: 21.5 KG/M2 | DIASTOLIC BLOOD PRESSURE: 92 MMHG | HEIGHT: 67 IN | TEMPERATURE: 97.7 F | RESPIRATION RATE: 18 BRPM | SYSTOLIC BLOOD PRESSURE: 155 MMHG | HEART RATE: 77 BPM

## 2020-08-10 DIAGNOSIS — R93.5 ABNORMAL ABDOMINAL CT SCAN: Primary | ICD-10-CM

## 2020-08-10 DIAGNOSIS — Z85.048 HISTORY OF RECTAL CANCER: ICD-10-CM

## 2020-08-10 DIAGNOSIS — K76.9 LIVER LESION: ICD-10-CM

## 2020-08-10 DIAGNOSIS — R93.5 ABNORMAL ABDOMINAL CT SCAN: ICD-10-CM

## 2020-08-10 LAB
ALBUMIN SERPL-MCNC: 4.5 G/DL (ref 3.5–5.2)
ALBUMIN/GLOB SERPL: 1.8 G/DL
ALP SERPL-CCNC: 90 U/L (ref 39–117)
ALT SERPL W P-5'-P-CCNC: 11 U/L (ref 1–41)
ANION GAP SERPL CALCULATED.3IONS-SCNC: 10 MMOL/L (ref 5–15)
AST SERPL-CCNC: 19 U/L (ref 1–40)
BASOPHILS # BLD AUTO: 0.03 10*3/MM3 (ref 0–0.2)
BASOPHILS NFR BLD AUTO: 0.6 % (ref 0–1.5)
BILIRUB SERPL-MCNC: 0.7 MG/DL (ref 0–1.2)
BUN SERPL-MCNC: 8 MG/DL (ref 8–23)
BUN SERPL-MCNC: ABNORMAL MG/DL
BUN/CREAT SERPL: ABNORMAL
CALCIUM SPEC-SCNC: 9.3 MG/DL (ref 8.6–10.5)
CEA SERPL-MCNC: 2.61 NG/ML
CHLORIDE SERPL-SCNC: 104 MMOL/L (ref 98–107)
CO2 SERPL-SCNC: 27 MMOL/L (ref 22–29)
CREAT SERPL-MCNC: 1.09 MG/DL (ref 0.76–1.27)
DEPRECATED RDW RBC AUTO: 46.5 FL (ref 37–54)
EOSINOPHIL # BLD AUTO: 0.11 10*3/MM3 (ref 0–0.4)
EOSINOPHIL NFR BLD AUTO: 2.1 % (ref 0.3–6.2)
ERYTHROCYTE [DISTWIDTH] IN BLOOD BY AUTOMATED COUNT: 14.4 % (ref 12.3–15.4)
GFR SERPL CREATININE-BSD FRML MDRD: 69 ML/MIN/1.73
GLOBULIN UR ELPH-MCNC: 2.5 GM/DL
GLUCOSE SERPL-MCNC: 107 MG/DL (ref 65–99)
HCT VFR BLD AUTO: 43.6 % (ref 37.5–51)
HGB BLD-MCNC: 14.7 G/DL (ref 13–17.7)
LYMPHOCYTES # BLD AUTO: 0.92 10*3/MM3 (ref 0.7–3.1)
LYMPHOCYTES NFR BLD AUTO: 17.9 % (ref 19.6–45.3)
MCH RBC QN AUTO: 30.4 PG (ref 26.6–33)
MCHC RBC AUTO-ENTMCNC: 33.7 G/DL (ref 31.5–35.7)
MCV RBC AUTO: 90.3 FL (ref 79–97)
MONOCYTES # BLD AUTO: 0.52 10*3/MM3 (ref 0.1–0.9)
MONOCYTES NFR BLD AUTO: 10.1 % (ref 5–12)
NEUTROPHILS NFR BLD AUTO: 3.55 10*3/MM3 (ref 1.7–7)
NEUTROPHILS NFR BLD AUTO: 69.3 % (ref 42.7–76)
PLATELET # BLD AUTO: 129 10*3/MM3 (ref 140–450)
PMV BLD AUTO: 11.7 FL (ref 6–12)
POTASSIUM SERPL-SCNC: 4.8 MMOL/L (ref 3.5–5.2)
PROT SERPL-MCNC: 7 G/DL (ref 6–8.5)
RBC # BLD AUTO: 4.83 10*6/MM3 (ref 4.14–5.8)
SODIUM SERPL-SCNC: 141 MMOL/L (ref 136–145)
WBC # BLD AUTO: 5.13 10*3/MM3 (ref 3.4–10.8)

## 2020-08-10 PROCEDURE — 85025 COMPLETE CBC W/AUTO DIFF WBC: CPT

## 2020-08-10 PROCEDURE — 99214 OFFICE O/P EST MOD 30 MIN: CPT | Performed by: INTERNAL MEDICINE

## 2020-08-10 PROCEDURE — 80053 COMPREHEN METABOLIC PANEL: CPT | Performed by: INTERNAL MEDICINE

## 2020-08-10 PROCEDURE — 82378 CARCINOEMBRYONIC ANTIGEN: CPT | Performed by: INTERNAL MEDICINE

## 2020-08-10 PROCEDURE — 36415 COLL VENOUS BLD VENIPUNCTURE: CPT | Performed by: INTERNAL MEDICINE

## 2020-08-19 ENCOUNTER — HOSPITAL ENCOUNTER (OUTPATIENT)
Dept: PET IMAGING | Facility: HOSPITAL | Age: 60
Discharge: HOME | End: 2020-08-19
Admitting: INTERNAL MEDICINE

## 2020-08-19 DIAGNOSIS — K76.9 LIVER LESION: ICD-10-CM

## 2020-08-19 DIAGNOSIS — Z85.048 HISTORY OF RECTAL CANCER: ICD-10-CM

## 2020-08-19 DIAGNOSIS — R93.5 ABNORMAL ABDOMINAL CT SCAN: ICD-10-CM

## 2020-08-19 PROCEDURE — 0 IOPAMIDOL PER 1 ML: Performed by: INTERNAL MEDICINE

## 2020-08-19 PROCEDURE — 74177 CT ABD & PELVIS W/CONTRAST: CPT

## 2020-08-19 PROCEDURE — 71260 CT THORAX DX C+: CPT

## 2020-08-19 RX ADMIN — IOPAMIDOL 100 ML: 755 INJECTION, SOLUTION INTRAVENOUS at 10:15

## 2020-10-22 ENCOUNTER — CLINICAL SUPPORT NO REQUIREMENTS (OUTPATIENT)
Dept: CARDIOLOGY | Facility: CLINIC | Age: 60
End: 2020-10-22

## 2020-10-22 ENCOUNTER — OFFICE VISIT (OUTPATIENT)
Dept: CARDIOLOGY | Facility: CLINIC | Age: 60
End: 2020-10-22

## 2020-10-22 VITALS
BODY MASS INDEX: 22.6 KG/M2 | HEIGHT: 67 IN | WEIGHT: 144 LBS | SYSTOLIC BLOOD PRESSURE: 144 MMHG | OXYGEN SATURATION: 97 % | HEART RATE: 62 BPM | DIASTOLIC BLOOD PRESSURE: 88 MMHG

## 2020-10-22 DIAGNOSIS — I10 ESSENTIAL HYPERTENSION: ICD-10-CM

## 2020-10-22 DIAGNOSIS — Z95.0 PACEMAKER: ICD-10-CM

## 2020-10-22 DIAGNOSIS — R23.3 EASY BRUISING: Primary | ICD-10-CM

## 2020-10-22 DIAGNOSIS — Z95.0 PACEMAKER: Primary | ICD-10-CM

## 2020-10-22 DIAGNOSIS — I42.0 DILATED CARDIOMYOPATHY (HCC): ICD-10-CM

## 2020-10-22 DIAGNOSIS — R00.1 BRADYCARDIA, SINUS: ICD-10-CM

## 2020-10-22 PROCEDURE — 93000 ELECTROCARDIOGRAM COMPLETE: CPT | Performed by: INTERNAL MEDICINE

## 2020-10-22 PROCEDURE — 93280 PM DEVICE PROGR EVAL DUAL: CPT | Performed by: INTERNAL MEDICINE

## 2020-10-22 PROCEDURE — 99214 OFFICE O/P EST MOD 30 MIN: CPT | Performed by: INTERNAL MEDICINE

## 2020-10-22 NOTE — PROGRESS NOTES
Subjective:     Encounter Date:10/22/2020      Patient ID: Dave Aguilar is a 60 y.o. male.    Chief Complaint : Complaining of bruising, here for follow-up for pacemaker, hypertension, cardiomyopathy  History of Present Illness      This is a 60-year-old white male with PMH of    # recurrent syncope  # severe bradycardia, status post MRI safe [employer] permanent pacemaker 05/13/2015  # blood loss anemia, colorectal adeno CA on chemo and XRT  # mildly elevated glucose  # Rectal cancer status post chemo August 2015     here for  follow-up.  Patient had rectal CA and surgery and chemotherapy.  Denies any chest pain, shortness of breath, palpitations.  Is complaining of easy bruising on the skin, with no aggravating or relieving factors, no associated symptoms..    Patient's arterial blood pressure is 144/88, heart rate 62, O2 sat of 97% on room air.  Patient had an echocardiogram done showing cardiomyopathy.  Patient's labs from 3/25/2020 reveal UA without proteinuria.  CMP, CBC from 2/20/2020 normal, labs from 8/10/2020 revealed CMP normal except for glucose of 107       ASSESSMENT;  #Easy bruising  # CMP  #  hypertension  #  abnormal EKG  #. Jay, syncope, S/P ppm  #. elevated blood glucose     PLAN;  Advised patient to go every other week with aspirin or hold it for a week and restarted.  Follow-up with hematology for labs including CBC to make sure patient does not have issues with platelets.  Continue medical management with lisinopril and metoprolol  Advised patient to check blood pressure at home.  Follow-up in pacemaker clinic.  Patient had pacemaker checked which is functioning well.  Reviewed hyperglycemia with patient, advised to follow-up with PMD for hyperglycemia  Reviewed EKG with patient and family   Counseled on walking exercise.    Assessment:          Diagnosis Plan   1. Easy bruising     2. Pacemaker     3. Essential hypertension     4. Dilated cardiomyopathy (CMS/HCC)            Plan:          Past Medical History:  Past Medical History:   Diagnosis Date   • Bradycardia     Hx of bradycardia-[facility]ed from [facility]    • Pacemaker 05/2015   • Rectal cancer (CMS/HCC)      Past Surgical History:  Past Surgical History:   Procedure Laterality Date   • COLECTOMY PARTIAL / TOTAL  06/22/2017    partial colectomy with takedown of colostomy/new colostomy-Dr. Fairchild-Abstracted from [facility].   • COLOSTOMY REVISION  06/22/2017    Dr. Fairchild-Abstracted from [facility].    • ILEOSTOMY  06/02/2016    ileostomy reversal-Dr. Fairchild-Abstracted from [facility].    • LOW ANTERIOR BOWEL RESECTION  08/07/2015    Ileostomy-[facility] from [facility]    • SIGMOIDOSCOPY  01/21/2017    with colostomy-Dr. Fairchild-Abstracted from [facility].    • VENOUS ACCESS DEVICE (PORT) INSERTION Right 05/2015    Subclavian infuse a port placement-Abstracted from [facility]    • VENOUS ACCESS DEVICE (PORT) REMOVAL  06/02/2016      Allergies:  No Known Allergies  Home Meds:  Current Meds:     Current Outpatient Medications:   •  aspirin (ASPIRIN ADULT LOW DOSE) 81 MG EC tablet, Daily., Disp: , Rfl:   •  lisinopril (PRINIVIL,ZESTRIL) 20 MG tablet, Take 1 tablet by mouth Daily., Disp: 90 tablet, Rfl: 3  •  metoprolol succinate XL (TOPROL-XL) 25 MG 24 hr tablet, TAKE 1 TABLET BY MOUTH DAILY, Disp: 90 tablet, Rfl: 2  Social History:   Social History     Tobacco Use   • Smoking status: Never Smoker   • Smokeless tobacco: Never Used   Substance Use Topics   • Alcohol use: No     Frequency: Never      Family History:  Family History   Problem Relation Age of Onset   • Stroke Mother    • Stroke Father    • Heart attack Sister    • Heart disease Other    • Hypertension Other         The following portions of the patient's history were reviewed and updated as appropriate: allergies, current medications, past family history, past medical history, past social history, past surgical history and problem list.      Review of Systems   Constitution: Negative for malaise/fatigue.   Cardiovascular:  "Negative for chest pain, leg swelling and palpitations.   Respiratory: Negative for shortness of breath.    Skin: Positive for color change. Negative for rash.   Neurological: Negative for dizziness, light-headedness and numbness.     All other systems are negative      ECG 12 Lead    Date/Time: 10/22/2020 12:44 PM  Performed by: Gino Britton MD  Authorized by: Gino Britton MD   Comparison: compared with previous ECG from 6/9/2017  Comparison to previous ECG: EKG done today reviewed by me shows 100% a paced rhythm with rate of 62 bpm with LVH, no new change compared to EKG from 6/9/2017                 Objective:     Physical Exam  /88   Pulse 62   Ht 170.2 cm (67\")   Wt 65.3 kg (144 lb)   SpO2 97%   BMI 22.55 kg/m²   General:  Appears in no acute distress  Eyes: Sclera is anicteric,  conjunctiva is clear   HEENT:  No JVD. Thyroid not visibly enlarged. No mucosal pallor or cyanosis  Respiratory: Respirations regular and unlabored at rest.  Bilaterally good breath sounds, with good air entry in all fields. No crackles, rubs or wheezes auscultated  Cardiovascular: S1,S2 Regular rate and rhythm. No murmur, rub or gallop auscultated. No pretibial pitting edema  Gastrointestinal: Abdomen soft, flat, non tender. Bowel sounds present.   Musculoskeletal:  No abnormal movements  Extremities: No digital clubbing or cyanosis  Skin: Color pink.  Some ecchymotic spots present on the hands  Neuro: Alert and awake, no lateralizing deficits appreciated    Lab Reviewed:                  "

## 2020-12-28 RX ORDER — METOPROLOL SUCCINATE 25 MG/1
TABLET, EXTENDED RELEASE ORAL
Qty: 90 TABLET | Refills: 2 | Status: SHIPPED | OUTPATIENT
Start: 2020-12-28 | End: 2021-09-20

## 2021-02-08 NOTE — PROGRESS NOTES
Hematology/Oncology Outpatient Follow Up    PATIENT NAME:Dave Aguilar  :1960  MRN: [id number]  PRIMARY CARE PHYSICIAN: Renato Foreman MD  REFERRING PHYSICIAN: Renato Foreman MD    Chief Complaint   Patient presents with   • Appointment     colorectal cancer   • Follow-up        HISTORY OF PRESENT ILLNESS:     This is a 60-year-old male who was diagnosed with stage III rectal adenocarcinoma.  Patient had presented with rectal bleeding while admitted to the hospital on 5/14/15.  At the time he had a colonoscopy that showed a rectal mass at 10 cm with near obstruction.  Patient did not have any clinical signs of colon obstruction.  • He had CT scan of the abdomen and pelvis done which revealed no evidence of liver involvement, but he had a rectal mass measuring 5.5 x 5.2 cm with wall thickening.  There were nonpathologically enlarged lymph nodes seen in the perirectal area.  One of the lymph nodes measured 5 mm.  He also had a CT scan of the chest which showed no acute abnormality within the chest.    • MRI of the pelvis was subsequently done and revealed rectal mass with viscerale tissue involvement and mildly enlarged perirectal lymphadenopathy.    • 5/8/15 - Pathology from rectal mass biopsy showed invasive, moderately differentiated adenocarcinoma, fragments of tubular adenoma.    • Due to significant amount of bleeding, patient had consultation with Dr. Whittaker who recommended a course of 9 Gy to be delivered in 3 fractions to control rectal bleeding and subsequently patient will be treated with daily XRT administered at 1.8 Gy per fraction for a total of 45 Gy along with concurrent chemotherapy.    • 5/9/15 - Serum CEA was measured at 10.   • 5/13/15 - Patient had permanent pacemaker implantation for syncope bradycardia secondary to sinus node dysfunction.   • Patient has been initiated on combined chemotherapy and radiation with continuous infusion 5-FU at 225 mg/M2 daily throughout the course  of radiation treatment.  His treatment was initiated on 5/18/15.    • 5/21/15 - PET/CT scan showed increased uptake in the rectum as expected with SUV of 11.98.  There was no evidence of local lymphadenopathy or hypermetabolic activity in the pelvis or inguinal nodes.  There was mild swirling of the distal mesentries which is totally asymptomatic.  No evidence of distant metastases was otherwise noted.    • 6/18/15 - Patient completed his neoadjuvant chemotherapy and radiation.    • 7/29/15 - Patient had an MRI of the abdomen and pelvis.  This revealed mass in the upper part of the rectum measuring 4.5 cm, smaller than on the prior exam.  The two perirectal nodes which were described originally appeared smaller.  The rest of his abdomen was unremarkable.    • 8/7/15 - Patient was taken to the operating room by Dr. Maria L Fairchild and underwent low anterior resection with low pelvic stapled anastomosis.  Mobilization of the splenic flexure and diverting loop ileostomy.  Pathology revealed moderately differentiated adenocarcinoma.  The tumor measured 3.5 cm.  The tumor was located above the peritoneal reflection.  There was no evidence of perforation.  There was no evidence of microsatellite instability.  Tumor invaded through muscularis propria into the subserosal adipose tissue.  There was no evidence of lymphovascular invasion, but there was evidence of perineural invasion.  All margins were negative.  Distance to the closest margin was 3 cm.  Pathologic stage is [id number].  Total of 23 lymph nodes were examined.  All were negative for metastatic carcinoma.    • 9/14/15 - Patient was initiated on cycle 1, day 1 of adjuvant FOLFOX 6.    • 10/26/15 - Treatment #4 of FOLFOX 6.  • 12/14/15 - Patient had cycle 7 of adjuvant FOLFOX.  • 2/8/16 - Patient had cycle 10 of FOLFOX 6.   • 3/7/16 - Patient received cycle 12 of FOLFOX 6.   • 3/17/16 - Patient was admitted to the hospital with syncopal episode.  Patient was found to be  hypotensive in the emergency room.  He was resuscitated with IV fluids.  He also had evidence of hepatic dysfunction with total bilirubin elevated to 5 and elevated liver function tests.    • 3/17/16 - During the hospitalization he had a CT scan of the abdomen and pelvis.  This revealed new 3.3 x 1.2 cm nonspecific fluid collection in the perirectal tissue.  There was also a 1.2 cm hypodense lesion in the right hepatic lobe.  MRI was recommended.  There was nonspecific thickening of the gallbladder wall and there was new mild hydronephrosis bilaterally.  Subsequently patient had an ultrasound of the abdomen which showed small amount of biliary sludge in the gallbladder, but no gallstones.  No definite findings to suggest cholecystitis.  CT scan of the head was negative for acute intracranial process.    • 3/18/16 - MRI of the abdomen with contrast revealed the area in the liver was atypical for colorectal metastases and this was thought to be most likely due to AV malformation.  Follow up MRI in three to six months was recommended.  The right hydronephrosis had resolved, but there was mild persistent hydronephrosis on the left.  • Patient was seen by Dr. Fairchild who was not too concerned about the perirectal fluid collection, but recommended antibiotics and rescanning in a few weeks to reevaluate.  Patient was also seen by Dr. Feldman with Urology service.  His bilateral hydronephrosis improved.  Throughout the hospitalization, patient also developed pancytopenia with leukopenia despite Neulasta.  During the course of his hospitalization he was noted to be hypotensive.  Patient was placed on Midodrine.    • 4/15/16 - CT scan of the abdomen and pelvis.  This basically showed persistent presacral soft tissue thickening with central fluid component.  Mild nonspecific thickening of the wall of the colon.    • 5/18/16 - PET/CT scan was essentially unremarkable.  No evidence of recurrent disease or increased activity in the  abdomen or pelvis to suggest metastatic disease.    • 6/2/16 - Colonoscopy and reversal of ileostomy.  The colonoscopy was essentially unremarkable.    • 6/17/16 - CT scan of the abdomen which showed evidence of post-op changes.  There was a 1.2 cm enhancing lesion in the posterior right hepatic lobe, stable in size.  Potential ileus.    • 8/17/16 - Patient had MRI of the liver which did not reveal any lesions in the liver.  No abnormal enhancing liver lesions to indicate metastatic disease were seen.  There was an abundant amount of stool throughout markedly dilated colon.  Patient has severe ileus with fecal impaction.  Patient has since then been seen by Dr. Fairchild.  He has another appointment scheduled for next week.    • 12/6/16 - CEA 1.8.  Chemistry panel:  BUN 4, creatinine 0.9.  • Patient was admitted to the hospital in January 2017.  At the time he underwent a diverting colostomy due to chronic rectal stricture resulting in colonic distention and small bowel distention.    • 1/19/17 - CT scan of the abdomen and pelvis which showed marked gaseous distention of the majority of the small bowel and entire colon.    • 3/21/17 - Patient had a colonoscopy per rectum and also stoma with dilatation of rectal stricture.  There were no masses identified.  There was mild narrowing, but no critical stricture of the rectum.  There was evidence of poor sphincter tone and rigidity of the tissues of the lower rectum.  Anal manometry was recommended by Dr. Fairchild to further evaluate the anal rectal function and decision for either reversal of colostomy or revision of his colostomy due to retraction will be made at that time.  • 4/13/17 - CT scan of the chest which showed stable minimal biapical pleural parenchymal scarring.  Otherwise the lungs were clear.  There were no pathologically enlarged intrathoracic lymph nodes identified.  Hepatic steatosis.    • 6/20/17 - Patient had an CHICHI which was negative.  Haptoglobin was normal  at 97.  SPEP immunofixation did not reveal any monoclonal protein.  Retic count normal at 1.06.  Folate normal at 16.  .  BUN 6, creatinine 0.9.  Methylmalonic acid was normal at 139.  PT 4.8, INR 1, PTT 29.2, fibrinogen normal at 324.    • Patient had CT scan of the chest, abdomen and pelvis on 1/8/18.  There was no evidence of recurrent disease in the chest, abdomen or pelvis.   • 5/18/18 - Bone marrow aspiration and biopsy showed normocellular bone marrow with adequate trilineage hematopoiesis.  There was no evidence of myeloproliferative disease, myelodysplastic disease.   Flow cytometry was negative.  Cytogenetics was normal male karyotype.  Megakaryocytes are adequate in number without any cytologic atypia.      • 10/2/18 - Patient was seen by Dr. Britton who ordered a 2D echocardiogram on 10/8/18.  EF was noted to be 25%.    • 1/24/19 - CT scan of the chest, abdomen and pelvis which did not show any evidence of metastatic disease in the abdomen.  But there was a 1 cm contrast enhancing mass in the right lobe of the liver, which could represent a hemangioma, but metastatic disease cannot be completely excluded.  Dedicated MRI of the liver has been recommended to further evaluate.    • 3/1/19 - MRI of the abdomen:  Spoke with Dr. Nicolette Agosto, the radiologist.  There was a 7 mm of focus of enhancement in the dome of the liver, which is new compared to prior imaging.  Does not have the typical MR appearance of metastatic lesion, but due to the fact that it is new, is worrisome.  Also according to Dr. Agosto, its location would be very difficult for image guided biopsy and also too small to be characterized better on a PET scan.  Therefore, follow up MRI of the liver was recommended in about three to four months.  The lesion is very close to the diaphragm which makes it more difficult to approach.  Patient will be notified and a follow up MRI will be scheduled.   • 5/8/19 - Colonoscopy revealed no polyps or  recurrent neoplasms seen.    • 5/30/19 - MRI of the abdomen which showed an enhancing lesion measuring 9 mm on the right hepatic lobe, unchanged from January 2019 CT.  Continued surveillance was recommended.  Of note this lesion is new from MRI of the abdomen from 2016.  Imaging characteristics are nonspecific.  • 6/3/19- CEA  1.3, bun 5, creat 1.1  • 11/7/2019-patient had MRI of the abdomen this showed a stable 9 mm lesion in the hepatic lobe.  It does not have typical enhancement characteristics of malignancy or metastatic disease.  Benign etiology such as hemangioma is favored follow-up in 6 to 12 months was recommended.  • 2/14/2020 patient had CT scan of the chest, abdomen and pelvis, there was no clear evidence of metastatic disease.  Stable 12 mm right liver lesion felt to represent a benign liver lesion.  There is thickening of the bladder wall and postop changes.    Past Medical History:   Diagnosis Date   • Bradycardia     Hx of bradycardia-Abstracted from [facility]    • Pacemaker 05/2015   • Rectal cancer (CMS/HCC)        Past Surgical History:   Procedure Laterality Date   • COLECTOMY PARTIAL / TOTAL  06/22/2017    partial colectomy with takedown of colostomy/new colostomy-Dr. Fairchild-[facility]ed from [facility]   • COLOSTOMY REVISION  06/22/2017    Dr. Fairchild-Abstracted from [facility]    • ILEOSTOMY  06/02/2016    ileostomy reversal-Dr. Fairchild-Abstracted from [facility]    • LOW ANTERIOR BOWEL RESECTION  08/07/2015    Ileostomy-Abstracted from [facility]    • SIGMOIDOSCOPY  01/21/2017    with colostomy-Dr. Fairchild-Abstracted from [facility]    • VENOUS ACCESS DEVICE (PORT) INSERTION Right 05/2015    Subclavian infuse a port placement-Abstracted from [facility]    • VENOUS ACCESS DEVICE (PORT) REMOVAL  06/02/2016         Current Outpatient Medications:   •  aspirin (ASPIRIN ADULT LOW DOSE) 81 MG EC tablet, Daily., Disp: , Rfl:   •  lisinopril (PRINIVIL,ZESTRIL) 20 MG tablet, Take 1 tablet by mouth Daily., Disp: 90 tablet, Rfl: 3  •  metoprolol  "succinate XL (TOPROL-XL) 25 MG 24 hr tablet, TAKE 1 TABLET BY MOUTH DAILY, Disp: 90 tablet, Rfl: 2    No Known Allergies    Family History   Problem Relation Age of Onset   • Stroke Mother    • Stroke Father    • Heart attack Sister    • Heart disease Other    • Hypertension Other        Cancer-related family history is not on file.    Social History     Tobacco Use   • Smoking status: Never Smoker   • Smokeless tobacco: Never Used   Substance Use Topics   • Alcohol use: No     Frequency: Never   • Drug use: No       I have reviewed and confirmed the accuracy of the patient's history: Chief complaint, HPI and ROS as entered by the MA/LPN/RN. Veronica Villanueva MD 02/10/21       SUBJECTIVE:    The patient is here for a follow up appointment.  Denies rectal bleeding or any other issues today          REVIEW OF SYSTEMS:  Review of Systems   Constitutional: Negative for chills and fever.   HENT: Negative for ear pain, mouth sores, nosebleeds and sore throat.    Eyes: Negative for photophobia and visual disturbance.   Respiratory: Negative for wheezing and stridor.    Cardiovascular: Negative for chest pain and palpitations.   Gastrointestinal: Negative for abdominal pain, diarrhea, nausea and vomiting.   Endocrine: Negative for cold intolerance and heat intolerance.   Genitourinary: Negative for dysuria and hematuria.   Musculoskeletal: Negative for joint swelling and neck stiffness.   Skin: Negative for color change and rash.   Neurological: Negative for seizures and syncope.   Hematological: Negative for adenopathy.        No obvious bleeding   Psychiatric/Behavioral: Negative for agitation, confusion and hallucinations.     ROS as documented above    OBJECTIVE:    Vitals:    02/10/21 0905   BP: 126/90   Pulse: 87   Resp: 20   Temp: 97.3 °F (36.3 °C)   Weight: 59 kg (130 lb)   Height: 170.2 cm (67\")   PainSc: 0-No pain       ECOG  (1) Restricted in physically strenuous activity, ambulatory and able to do work of " light nature    Physical Exam   Constitutional: He is oriented to person, place, and time. No distress.   HENT:   Head: Normocephalic and atraumatic.   Eyes: Conjunctivae are normal. Right eye exhibits no discharge. Left eye exhibits no discharge. No scleral icterus.   Neck: Normal range of motion. Neck supple. No thyromegaly present.   Cardiovascular: Normal rate, regular rhythm and normal heart sounds. Exam reveals no gallop and no friction rub.   Pulmonary/Chest: Effort normal. No stridor. No respiratory distress. He has no wheezes.   Abdominal: Soft. Bowel sounds are normal. He exhibits no mass. There is no abdominal tenderness. There is no rebound and no guarding.   Colostomy in place   Musculoskeletal: Normal range of motion. No tenderness.   Lymphadenopathy:     He has no cervical adenopathy.   Neurological: He is alert and oriented to person, place, and time. He exhibits normal muscle tone.   Skin: Skin is warm. No rash noted. He is not diaphoretic. No erythema.   Psychiatric: His behavior is normal.   Nursing note and vitals reviewed.    Physical exam as documented above  I have reexamined the patient and the results are consistent with the previously documented exam. Veronica Villanueva MD     RECENT LABS    WBC   Date Value Ref Range Status   02/10/2021 5.78 3.40 - 10.80 10*3/mm3 Final     RBC   Date Value Ref Range Status   02/10/2021 4.98 4.14 - 5.80 10*6/mm3 Final     Hemoglobin   Date Value Ref Range Status   02/10/2021 15.2 13.0 - 17.7 g/dL Final     Hematocrit   Date Value Ref Range Status   02/10/2021 44.8 37.5 - 51.0 % Final     MCV   Date Value Ref Range Status   02/10/2021 90.0 79.0 - 97.0 fL Final     MCH   Date Value Ref Range Status   02/10/2021 30.5 26.6 - 33.0 pg Final     MCHC   Date Value Ref Range Status   02/10/2021 33.9 31.5 - 35.7 g/dL Final     RDW   Date Value Ref Range Status   02/10/2021 13.8 12.3 - 15.4 % Final     RDW-SD   Date Value Ref Range Status   02/10/2021 44.9 37.0 -  54.0 fl Final     MPV   Date Value Ref Range Status   02/10/2021 11.1 6.0 - 12.0 fL Final     Platelets   Date Value Ref Range Status   02/10/2021 197 140 - 450 10*3/mm3 Final     Neutrophil %   Date Value Ref Range Status   02/10/2021 66.3 42.7 - 76.0 % Final     Lymphocyte %   Date Value Ref Range Status   02/10/2021 22.1 19.6 - 45.3 % Final     Monocyte %   Date Value Ref Range Status   02/10/2021 9.7 5.0 - 12.0 % Final     Eosinophil %   Date Value Ref Range Status   02/10/2021 1.4 0.3 - 6.2 % Final     Basophil %   Date Value Ref Range Status   02/10/2021 0.5 0.0 - 1.5 % Final     Neutrophils, Absolute   Date Value Ref Range Status   02/10/2021 3.83 1.70 - 7.00 10*3/mm3 Final     Lymphocytes, Absolute   Date Value Ref Range Status   02/10/2021 1.28 0.70 - 3.10 10*3/mm3 Final     Monocytes, Absolute   Date Value Ref Range Status   02/10/2021 0.56 0.10 - 0.90 10*3/mm3 Final     Eosinophils, Absolute   Date Value Ref Range Status   02/10/2021 0.08 0.00 - 0.40 10*3/mm3 Final     Basophils, Absolute   Date Value Ref Range Status   02/10/2021 0.03 0.00 - 0.20 10*3/mm3 Final     nRBC   Date Value Ref Range Status   06/30/2017 0 0 /100[WBCs] Final       Lab Results   Component Value Date    GLUCOSE 107 (H) 08/10/2020    BUN  08/10/2020      Comment:      Testing performed by alternate method    BUN 8 08/10/2020    CREATININE 1.09 08/10/2020    EGFRIFNONA 69 08/10/2020    EGFRIFAFRI >60 05/30/2019    BCR  08/10/2020      Comment:      Testing not performed    K 4.8 08/10/2020    CO2 27.0 08/10/2020    CALCIUM 9.3 08/10/2020    ALBUMIN 4.50 08/10/2020    LABIL2 1.4 06/03/2019    AST 19 08/10/2020    ALT 11 08/10/2020         Assessment/Plan     Abnormal abdominal CT scan  - CBC & Differential  - CBC Auto Differential      ASSESSMENT:    1. Stage III rectal adenocarcinoma, status post combined chemotherapy and radiation with infusional 5-FU.  Residual malignancy is [id number].  Status post low anterior resection with low  pelvic stapled anastomosis and diverting loop ileostomy.  Status post adjuvant chemotherapy with FOLFOX 6.  Patient is on surveillance for relapse.  2. B12 deficiency, on oral B12 supplement.  We will check B12 level today  3. Mild chemo-induced neuropathy.   4. Possible AV malformation in the liver.  MRI of the liver from August 2016 did not reveal any lesions.  Surveillance imaging  5. Thrombocytopenia.  Status post bone marrow aspiration and biopsy with negative findings.  Platelets remained stable  6. Status post ileostomy reversal  7. Rectal stricture.   8. Diverting colostomy due to chronic colonic obstruction from rectal stricture.    9. Cardiomyopathy with EF of 30 to 35% on ECHO done 8/7/2020.  Follow-up with cardiologist     10. Liver lesion likely benign: Reevaluate with CTs    PLANS:     CT C/A/P with contrast due in April 2021  A CMP, CEA, B12 level level will be drawn today.   Patient will also follow up with the rest of his physicians for his ongoing medical problems.   He has a history of B12 deficiency. Patient will transition to oral B12 supplementation.  I will check his levels periodically.  I explained the above to patient and his niece who is present today.     Patient to follow-up with Dr. Britton  his cardiologist for cardiomyopathy  Follow-up with PCP           I have reviewed labs results, imaging, vitals, and medications with the patient today. Will follow up in 6 months with me.      Patient verbalized understanding and is in agreement of the above plan.

## 2021-02-10 ENCOUNTER — OFFICE VISIT (OUTPATIENT)
Dept: ONCOLOGY | Facility: CLINIC | Age: 61
End: 2021-02-10

## 2021-02-10 ENCOUNTER — APPOINTMENT (OUTPATIENT)
Dept: LAB | Facility: HOSPITAL | Age: 61
End: 2021-02-10

## 2021-02-10 VITALS
TEMPERATURE: 97.3 F | HEART RATE: 87 BPM | DIASTOLIC BLOOD PRESSURE: 90 MMHG | BODY MASS INDEX: 20.4 KG/M2 | HEIGHT: 67 IN | SYSTOLIC BLOOD PRESSURE: 126 MMHG | RESPIRATION RATE: 20 BRPM | WEIGHT: 130 LBS

## 2021-02-10 DIAGNOSIS — K76.9 LIVER LESION: ICD-10-CM

## 2021-02-10 DIAGNOSIS — Z85.048 HISTORY OF RECTAL CANCER: ICD-10-CM

## 2021-02-10 DIAGNOSIS — R93.5 ABNORMAL ABDOMINAL CT SCAN: Primary | ICD-10-CM

## 2021-02-10 LAB
ALBUMIN SERPL-MCNC: 4.3 G/DL (ref 3.5–5.2)
ALBUMIN/GLOB SERPL: 1.5 G/DL
ALP SERPL-CCNC: 103 U/L (ref 39–117)
ALT SERPL W P-5'-P-CCNC: 17 U/L (ref 1–41)
ANION GAP SERPL CALCULATED.3IONS-SCNC: 8 MMOL/L (ref 5–15)
AST SERPL-CCNC: 23 U/L (ref 1–40)
BASOPHILS # BLD AUTO: 0.03 10*3/MM3 (ref 0–0.2)
BASOPHILS NFR BLD AUTO: 0.5 % (ref 0–1.5)
BILIRUB SERPL-MCNC: 0.6 MG/DL (ref 0–1.2)
BUN SERPL-MCNC: 7 MG/DL (ref 8–23)
BUN/CREAT SERPL: 6.1 (ref 7–25)
CALCIUM SPEC-SCNC: 9.5 MG/DL (ref 8.6–10.5)
CEA SERPL-MCNC: 1.64 NG/ML
CHLORIDE SERPL-SCNC: 103 MMOL/L (ref 98–107)
CO2 SERPL-SCNC: 29 MMOL/L (ref 22–29)
CREAT SERPL-MCNC: 1.14 MG/DL (ref 0.76–1.27)
DEPRECATED RDW RBC AUTO: 44.9 FL (ref 37–54)
EOSINOPHIL # BLD AUTO: 0.08 10*3/MM3 (ref 0–0.4)
EOSINOPHIL NFR BLD AUTO: 1.4 % (ref 0.3–6.2)
ERYTHROCYTE [DISTWIDTH] IN BLOOD BY AUTOMATED COUNT: 13.8 % (ref 12.3–15.4)
GFR SERPL CREATININE-BSD FRML MDRD: 65 ML/MIN/1.73
GLOBULIN UR ELPH-MCNC: 2.8 GM/DL
GLUCOSE SERPL-MCNC: 98 MG/DL (ref 65–99)
HCT VFR BLD AUTO: 44.8 % (ref 37.5–51)
HGB BLD-MCNC: 15.2 G/DL (ref 13–17.7)
LYMPHOCYTES # BLD AUTO: 1.28 10*3/MM3 (ref 0.7–3.1)
LYMPHOCYTES NFR BLD AUTO: 22.1 % (ref 19.6–45.3)
MCH RBC QN AUTO: 30.5 PG (ref 26.6–33)
MCHC RBC AUTO-ENTMCNC: 33.9 G/DL (ref 31.5–35.7)
MCV RBC AUTO: 90 FL (ref 79–97)
MONOCYTES # BLD AUTO: 0.56 10*3/MM3 (ref 0.1–0.9)
MONOCYTES NFR BLD AUTO: 9.7 % (ref 5–12)
NEUTROPHILS NFR BLD AUTO: 3.83 10*3/MM3 (ref 1.7–7)
NEUTROPHILS NFR BLD AUTO: 66.3 % (ref 42.7–76)
PLATELET # BLD AUTO: 197 10*3/MM3 (ref 140–450)
PMV BLD AUTO: 11.1 FL (ref 6–12)
POTASSIUM SERPL-SCNC: 4.5 MMOL/L (ref 3.5–5.2)
PROT SERPL-MCNC: 7.1 G/DL (ref 6–8.5)
RBC # BLD AUTO: 4.98 10*6/MM3 (ref 4.14–5.8)
SODIUM SERPL-SCNC: 140 MMOL/L (ref 136–145)
WBC # BLD AUTO: 5.78 10*3/MM3 (ref 3.4–10.8)

## 2021-02-10 PROCEDURE — 99214 OFFICE O/P EST MOD 30 MIN: CPT | Performed by: INTERNAL MEDICINE

## 2021-02-10 PROCEDURE — 85025 COMPLETE CBC W/AUTO DIFF WBC: CPT | Performed by: INTERNAL MEDICINE

## 2021-02-10 PROCEDURE — 82378 CARCINOEMBRYONIC ANTIGEN: CPT | Performed by: INTERNAL MEDICINE

## 2021-02-10 PROCEDURE — 36415 COLL VENOUS BLD VENIPUNCTURE: CPT | Performed by: INTERNAL MEDICINE

## 2021-02-10 PROCEDURE — 80053 COMPREHEN METABOLIC PANEL: CPT | Performed by: INTERNAL MEDICINE

## 2021-04-05 ENCOUNTER — APPOINTMENT (OUTPATIENT)
Dept: PET IMAGING | Facility: HOSPITAL | Age: 61
End: 2021-04-05

## 2021-04-05 RX ORDER — LISINOPRIL 20 MG/1
20 TABLET ORAL DAILY
Qty: 90 TABLET | Refills: 2 | Status: SHIPPED | OUTPATIENT
Start: 2021-04-05 | End: 2021-12-20

## 2021-04-19 ENCOUNTER — HOSPITAL ENCOUNTER (OUTPATIENT)
Dept: PET IMAGING | Facility: HOSPITAL | Age: 61
Discharge: HOME | End: 2021-04-19
Admitting: INTERNAL MEDICINE

## 2021-04-19 DIAGNOSIS — Z85.048 HISTORY OF RECTAL CANCER: ICD-10-CM

## 2021-04-19 DIAGNOSIS — K76.9 LIVER LESION: ICD-10-CM

## 2021-04-19 DIAGNOSIS — R93.5 ABNORMAL ABDOMINAL CT SCAN: ICD-10-CM

## 2021-04-19 LAB — CREAT BLDA-MCNC: 1.2 MG/DL (ref 0.6–1.3)

## 2021-04-19 PROCEDURE — 82565 ASSAY OF CREATININE: CPT

## 2021-04-19 PROCEDURE — 74177 CT ABD & PELVIS W/CONTRAST: CPT

## 2021-04-19 PROCEDURE — 0 IOPAMIDOL PER 1 ML: Performed by: INTERNAL MEDICINE

## 2021-04-19 PROCEDURE — 71260 CT THORAX DX C+: CPT

## 2021-04-19 RX ADMIN — IOPAMIDOL 100 ML: 755 INJECTION, SOLUTION INTRAVENOUS at 09:17

## 2021-05-06 ENCOUNTER — CLINICAL SUPPORT NO REQUIREMENTS (OUTPATIENT)
Dept: CARDIOLOGY | Facility: CLINIC | Age: 61
End: 2021-05-06

## 2021-05-06 DIAGNOSIS — Z95.0 PACEMAKER: Primary | ICD-10-CM

## 2021-05-06 DIAGNOSIS — R00.1 BRADYCARDIA, SINUS: ICD-10-CM

## 2021-05-06 PROCEDURE — 93280 PM DEVICE PROGR EVAL DUAL: CPT | Performed by: INTERNAL MEDICINE

## 2021-05-18 ENCOUNTER — TELEPHONE (OUTPATIENT)
Dept: ONCOLOGY | Facility: CLINIC | Age: 61
End: 2021-05-18

## 2021-05-18 NOTE — TELEPHONE ENCOUNTER
----- Message from ANDRADE Brennan sent at 5/14/2021  9:20 AM EDT -----  Please call patient to let him know that his CT was stable.  There was a new small abdominal hernia by ostomy site but it is not obstructing anything.  Instruct patient to inform us if he has any issues.      Thanks!

## 2021-05-27 ENCOUNTER — TELEPHONE (OUTPATIENT)
Dept: ONCOLOGY | Facility: CLINIC | Age: 61
End: 2021-05-27

## 2021-05-27 NOTE — TELEPHONE ENCOUNTER
Called pt to let him know that his CT was stable and that there was a new small abdominal hernia by his ostomy site, but it is not obstructing anything. Pt denied having any issues and I instructed him to let us know if that changes. He verbalized understanding.

## 2021-08-06 NOTE — PROGRESS NOTES
Hematology/Oncology Outpatient Follow Up    PATIENT NAME:Dave Aguilar  :1960  MRN: [id number]  PRIMARY CARE PHYSICIAN: Renato Foreman MD  REFERRING PHYSICIAN: Renato Foreman MD    Chief Complaint   Patient presents with   • Follow-up   • Colon Cancer        HISTORY OF PRESENT ILLNESS:     This is a 60-year-old male who was diagnosed with stage III rectal adenocarcinoma.  Patient had presented with rectal bleeding while admitted to the hospital on 5/14/15.  At the time he had a colonoscopy that showed a rectal mass at 10 cm with near obstruction.  Patient did not have any clinical signs of colon obstruction.  • He had CT scan of the abdomen and pelvis done which revealed no evidence of liver involvement, but he had a rectal mass measuring 5.5 x 5.2 cm with wall thickening.  There were nonpathologically enlarged lymph nodes seen in the perirectal area.  One of the lymph nodes measured 5 mm.  He also had a CT scan of the chest which showed no acute abnormality within the chest.    • MRI of the pelvis was subsequently done and revealed rectal mass with viscerale tissue involvement and mildly enlarged perirectal lymphadenopathy.    • 5/8/15 - Pathology from rectal mass biopsy showed invasive, moderately differentiated adenocarcinoma, fragments of tubular adenoma.    • Due to significant amount of bleeding, patient had consultation with Dr. Whittaker who recommended a course of 9 Gy to be delivered in 3 fractions to control rectal bleeding and subsequently patient will be treated with daily XRT administered at 1.8 Gy per fraction for a total of 45 Gy along with concurrent chemotherapy.    • 5/9/15 - Serum CEA was measured at 10.   • 5/13/15 - Patient had permanent pacemaker implantation for syncope bradycardia secondary to sinus node dysfunction.   • Patient has been initiated on combined chemotherapy and radiation with continuous infusion 5-FU at 225 mg/M2 daily throughout the course of radiation  treatment.  His treatment was initiated on 5/18/15.    • 5/21/15 - PET/CT scan showed increased uptake in the rectum as expected with SUV of 11.98.  There was no evidence of local lymphadenopathy or hypermetabolic activity in the pelvis or inguinal nodes.  There was mild swirling of the distal mesentries which is totally asymptomatic.  No evidence of distant metastases was otherwise noted.    • 6/18/15 - Patient completed his neoadjuvant chemotherapy and radiation.    • 7/29/15 - Patient had an MRI of the abdomen and pelvis.  This revealed mass in the upper part of the rectum measuring 4.5 cm, smaller than on the prior exam.  The two perirectal nodes which were described originally appeared smaller.  The rest of his abdomen was unremarkable.    • 8/7/15 - Patient was taken to the operating room by Dr. Maria L Fairchild and underwent low anterior resection with low pelvic stapled anastomosis.  Mobilization of the splenic flexure and diverting loop ileostomy.  Pathology revealed moderately differentiated adenocarcinoma.  The tumor measured 3.5 cm.  The tumor was located above the peritoneal reflection.  There was no evidence of perforation.  There was no evidence of microsatellite instability.  Tumor invaded through muscularis propria into the subserosal adipose tissue.  There was no evidence of lymphovascular invasion, but there was evidence of perineural invasion.  All margins were negative.  Distance to the closest margin was 3 cm.  Pathologic stage is [id number].  Total of 23 lymph nodes were examined.  All were negative for metastatic carcinoma.    • 9/14/15 - Patient was initiated on cycle 1, day 1 of adjuvant FOLFOX 6.    • 10/26/15 - Treatment #4 of FOLFOX 6.  • 12/14/15 - Patient had cycle 7 of adjuvant FOLFOX.  • 2/8/16 - Patient had cycle 10 of FOLFOX 6.   • 3/7/16 - Patient received cycle 12 of FOLFOX 6.   • 3/17/16 - Patient was admitted to the hospital with syncopal episode.  Patient was found to be hypotensive in  the emergency room.  He was resuscitated with IV fluids.  He also had evidence of hepatic dysfunction with total bilirubin elevated to 5 and elevated liver function tests.    • 3/17/16 - During the hospitalization he had a CT scan of the abdomen and pelvis.  This revealed new 3.3 x 1.2 cm nonspecific fluid collection in the perirectal tissue.  There was also a 1.2 cm hypodense lesion in the right hepatic lobe.  MRI was recommended.  There was nonspecific thickening of the gallbladder wall and there was new mild hydronephrosis bilaterally.  Subsequently patient had an ultrasound of the abdomen which showed small amount of biliary sludge in the gallbladder, but no gallstones.  No definite findings to suggest cholecystitis.  CT scan of the head was negative for acute intracranial process.    • 3/18/16 - MRI of the abdomen with contrast revealed the area in the liver was atypical for colorectal metastases and this was thought to be most likely due to AV malformation.  Follow up MRI in three to six months was recommended.  The right hydronephrosis had resolved, but there was mild persistent hydronephrosis on the left.  • Patient was seen by Dr. Fairchild who was not too concerned about the perirectal fluid collection, but recommended antibiotics and rescanning in a few weeks to reevaluate.  Patient was also seen by Dr. Feldman with Urology service.  His bilateral hydronephrosis improved.  Throughout the hospitalization, patient also developed pancytopenia with leukopenia despite Neulasta.  During the course of his hospitalization he was noted to be hypotensive.  Patient was placed on Midodrine.    • 4/15/16 - CT scan of the abdomen and pelvis.  This basically showed persistent presacral soft tissue thickening with central fluid component.  Mild nonspecific thickening of the wall of the colon.    • 5/18/16 - PET/CT scan was essentially unremarkable.  No evidence of recurrent disease or increased activity in the abdomen or pelvis  to suggest metastatic disease.    • 6/2/16 - Colonoscopy and reversal of ileostomy.  The colonoscopy was essentially unremarkable.    • 6/17/16 - CT scan of the abdomen which showed evidence of post-op changes.  There was a 1.2 cm enhancing lesion in the posterior right hepatic lobe, stable in size.  Potential ileus.    • 8/17/16 - Patient had MRI of the liver which did not reveal any lesions in the liver.  No abnormal enhancing liver lesions to indicate metastatic disease were seen.  There was an abundant amount of stool throughout markedly dilated colon.  Patient has severe ileus with fecal impaction.  Patient has since then been seen by Dr. Fairchild.  He has another appointment scheduled for next week.    • 12/6/16 - CEA 1.8.  Chemistry panel:  BUN 4, creatinine 0.9.  • Patient was admitted to the hospital in January 2017.  At the time he underwent a diverting colostomy due to chronic rectal stricture resulting in colonic distention and small bowel distention.    • 1/19/17 - CT scan of the abdomen and pelvis which showed marked gaseous distention of the majority of the small bowel and entire colon.    • 3/21/17 - Patient had a colonoscopy per rectum and also stoma with dilatation of rectal stricture.  There were no masses identified.  There was mild narrowing, but no critical stricture of the rectum.  There was evidence of poor sphincter tone and rigidity of the tissues of the lower rectum.  Anal manometry was recommended by Dr. Fairchild to further evaluate the anal rectal function and decision for either reversal of colostomy or revision of his colostomy due to retraction will be made at that time.  • 4/13/17 - CT scan of the chest which showed stable minimal biapical pleural parenchymal scarring.  Otherwise the lungs were clear.  There were no pathologically enlarged intrathoracic lymph nodes identified.  Hepatic steatosis.    • 6/20/17 - Patient had an CHICHI which was negative.  Haptoglobin was normal at 97.  SPEP  immunofixation did not reveal any monoclonal protein.  Retic count normal at 1.06.  Folate normal at 16.  .  BUN 6, creatinine 0.9.  Methylmalonic acid was normal at 139.  PT 4.8, INR 1, PTT 29.2, fibrinogen normal at 324.    • Patient had CT scan of the chest, abdomen and pelvis on 1/8/18.  There was no evidence of recurrent disease in the chest, abdomen or pelvis.   • 5/18/18 - Bone marrow aspiration and biopsy showed normocellular bone marrow with adequate trilineage hematopoiesis.  There was no evidence of myeloproliferative disease, myelodysplastic disease.   Flow cytometry was negative.  Cytogenetics was normal male karyotype.  Megakaryocytes are adequate in number without any cytologic atypia.      • 10/2/18 - Patient was seen by Dr. Britton who ordered a 2D echocardiogram on 10/8/18.  EF was noted to be 25%.    • 1/24/19 - CT scan of the chest, abdomen and pelvis which did not show any evidence of metastatic disease in the abdomen.  But there was a 1 cm contrast enhancing mass in the right lobe of the liver, which could represent a hemangioma, but metastatic disease cannot be completely excluded.  Dedicated MRI of the liver has been recommended to further evaluate.    • 3/1/19 - MRI of the abdomen:  Spoke with Dr. Nicolette Agosto, the radiologist.  There was a 7 mm of focus of enhancement in the dome of the liver, which is new compared to prior imaging.  Does not have the typical MR appearance of metastatic lesion, but due to the fact that it is new, is worrisome.  Also according to Dr. Agosto, its location would be very difficult for image guided biopsy and also too small to be characterized better on a PET scan.  Therefore, follow up MRI of the liver was recommended in about three to four months.  The lesion is very close to the diaphragm which makes it more difficult to approach.  Patient will be notified and a follow up MRI will be scheduled.   • 5/8/19 - Colonoscopy revealed no polyps or recurrent  neoplasms seen.    • 5/30/19 - MRI of the abdomen which showed an enhancing lesion measuring 9 mm on the right hepatic lobe, unchanged from January 2019 CT.  Continued surveillance was recommended.  Of note this lesion is new from MRI of the abdomen from 2016.  Imaging characteristics are nonspecific.  • 6/3/19- CEA  1.3, bun 5, creat 1.1  • 11/7/2019-patient had MRI of the abdomen this showed a stable 9 mm lesion in the hepatic lobe.  It does not have typical enhancement characteristics of malignancy or metastatic disease.  Benign etiology such as hemangioma is favored follow-up in 6 to 12 months was recommended.  • 2/14/2020 patient had CT scan of the chest, abdomen and pelvis, there was no clear evidence of metastatic disease.  Stable 12 mm right liver lesion felt to represent a benign liver lesion.  There is thickening of the bladder wall and postop changes.    Past Medical History:   Diagnosis Date   • Bradycardia     Hx of bradycardia-Abstracted from [facility]    • Pacemaker 05/2015   • Rectal cancer (CMS/HCC)        Past Surgical History:   Procedure Laterality Date   • COLECTOMY PARTIAL / TOTAL  06/22/2017    partial colectomy with takedown of colostomy/new colostomy-Dr. Fairchild-[facility]ed from [facility]   • COLOSTOMY REVISION  06/22/2017    Dr. Fairchild-Abstracted from [facility]    • ILEOSTOMY  06/02/2016    ileostomy reversal-Dr. Fairchild-[facility]ed from [facility]    • LOW ANTERIOR BOWEL RESECTION  08/07/2015    Ileostomy-Abstracted from [facility]    • SIGMOIDOSCOPY  01/21/2017    with colostomy-Dr. Fairchild-Abstracted from [facility]    • VENOUS ACCESS DEVICE (PORT) INSERTION Right 05/2015    Subclavian infuse a port placement-Abstracted from [facility]    • VENOUS ACCESS DEVICE (PORT) REMOVAL  06/02/2016         Current Outpatient Medications:   •  aspirin (ASPIRIN ADULT LOW DOSE) 81 MG EC tablet, Daily., Disp: , Rfl:   •  lisinopril (PRINIVIL,ZESTRIL) 20 MG tablet, TAKE 1 TABLET BY MOUTH DAILY, Disp: 90 tablet, Rfl: 2  •  metoprolol succinate XL  "(TOPROL-XL) 25 MG 24 hr tablet, TAKE 1 TABLET BY MOUTH DAILY, Disp: 90 tablet, Rfl: 2    No Known Allergies    Family History   Problem Relation Age of Onset   • Stroke Mother    • Stroke Father    • Heart attack Sister    • Heart disease Other    • Hypertension Other        Cancer-related family history is not on file.    Social History     Tobacco Use   • Smoking status: Never Smoker   • Smokeless tobacco: Never Used   Substance Use Topics   • Alcohol use: No   • Drug use: No       I have reviewed and confirmed the accuracy of the patient's history: Chief complaint, HPI and ROS as entered by the MA/LPN/RN. Veronica Villanueva MD 08/09/21       SUBJECTIVE:    Patient does not have any specific complaints today.          REVIEW OF SYSTEMS:  Review of Systems   Constitutional: Negative for chills and fever.   HENT: Negative for ear pain, mouth sores, nosebleeds and sore throat.    Eyes: Negative for photophobia and visual disturbance.   Respiratory: Negative for wheezing and stridor.    Cardiovascular: Negative for chest pain and palpitations.   Gastrointestinal: Negative for abdominal pain, diarrhea, nausea and vomiting.   Endocrine: Negative for cold intolerance and heat intolerance.   Genitourinary: Negative for dysuria and hematuria.   Musculoskeletal: Negative for joint swelling and neck stiffness.   Skin: Negative for color change and rash.   Neurological: Negative for seizures and syncope.   Hematological: Negative for adenopathy.        No obvious bleeding   Psychiatric/Behavioral: Negative for agitation, confusion and hallucinations.     ROS as documented above    OBJECTIVE:    Vitals:    08/09/21 0945   BP: 146/98   Pulse: 69   Resp: 18   Temp: 97.9 °F (36.6 °C)   TempSrc: Infrared   Weight: 67.1 kg (148 lb)   Height: 170.2 cm (67\")   PainSc: 0-No pain       ECOG  (1) Restricted in physically strenuous activity, ambulatory and able to do work of light nature    Physical Exam   Constitutional: He is " oriented to person, place, and time. No distress.   HENT:   Head: Normocephalic and atraumatic.   Eyes: Conjunctivae are normal. Right eye exhibits no discharge. Left eye exhibits no discharge. No scleral icterus.   Neck: No thyromegaly present.   Cardiovascular: Normal rate, regular rhythm and normal heart sounds. Exam reveals no gallop and no friction rub.   Pulmonary/Chest: Effort normal. No stridor. No respiratory distress. He has no wheezes.   Abdominal: Soft. Bowel sounds are normal. He exhibits no mass. There is no abdominal tenderness. There is no rebound and no guarding.   Colostomy in place   Musculoskeletal: Normal range of motion. No tenderness.   Lymphadenopathy:     He has no cervical adenopathy.   Neurological: He is alert and oriented to person, place, and time. He exhibits normal muscle tone.   Skin: Skin is warm. No rash noted. He is not diaphoretic. No erythema.   Psychiatric: His behavior is normal.   Nursing note and vitals reviewed.    Physical exam as documented above    I have reexamined the patient and the results are consistent with the previously documented exam. Veronica María Villanueva MD     RECENT LABS    WBC   Date Value Ref Range Status   08/09/2021 4.75 3.40 - 10.80 10*3/mm3 Final     RBC   Date Value Ref Range Status   08/09/2021 4.78 4.14 - 5.80 10*6/mm3 Final     Hemoglobin   Date Value Ref Range Status   08/09/2021 14.6 13.0 - 17.7 g/dL Final     Hematocrit   Date Value Ref Range Status   08/09/2021 44.0 37.5 - 51.0 % Final     MCV   Date Value Ref Range Status   08/09/2021 92.1 79.0 - 97.0 fL Final     MCH   Date Value Ref Range Status   08/09/2021 30.5 26.6 - 33.0 pg Final     MCHC   Date Value Ref Range Status   08/09/2021 33.2 31.5 - 35.7 g/dL Final     RDW   Date Value Ref Range Status   08/09/2021 13.7 12.3 - 15.4 % Final     RDW-SD   Date Value Ref Range Status   08/09/2021 45.6 37.0 - 54.0 fl Final     MPV   Date Value Ref Range Status   08/09/2021 10.6 6.0 - 12.0 fL Final      Platelets   Date Value Ref Range Status   08/09/2021 156 140 - 450 10*3/mm3 Final     Neutrophil %   Date Value Ref Range Status   08/09/2021 66.8 42.7 - 76.0 % Final     Lymphocyte %   Date Value Ref Range Status   08/09/2021 19.8 19.6 - 45.3 % Final     Monocyte %   Date Value Ref Range Status   08/09/2021 10.7 5.0 - 12.0 % Final     Eosinophil %   Date Value Ref Range Status   08/09/2021 2.1 0.3 - 6.2 % Final     Basophil %   Date Value Ref Range Status   08/09/2021 0.6 0.0 - 1.5 % Final     Neutrophils, Absolute   Date Value Ref Range Status   08/09/2021 3.17 1.70 - 7.00 10*3/mm3 Final     Lymphocytes, Absolute   Date Value Ref Range Status   08/09/2021 0.94 0.70 - 3.10 10*3/mm3 Final     Monocytes, Absolute   Date Value Ref Range Status   08/09/2021 0.51 0.10 - 0.90 10*3/mm3 Final     Eosinophils, Absolute   Date Value Ref Range Status   08/09/2021 0.10 0.00 - 0.40 10*3/mm3 Final     Basophils, Absolute   Date Value Ref Range Status   08/09/2021 0.03 0.00 - 0.20 10*3/mm3 Final     nRBC   Date Value Ref Range Status   06/30/2017 0 0 /100[WBCs] Final       Lab Results   Component Value Date    GLUCOSE 91 08/09/2021    BUN 7 (L) 08/09/2021    CREATININE 1.06 08/09/2021    EGFRIFNONA 71 08/09/2021    EGFRIFAFRI >60 05/30/2019    BCR 6.6 (L) 08/09/2021    K 4.3 08/09/2021    CO2 26.0 08/09/2021    CALCIUM 8.6 08/09/2021    ALBUMIN 4.00 08/09/2021    LABIL2 1.4 06/03/2019    AST 16 08/09/2021    ALT 15 08/09/2021           ASSESSMENT:    1. Stage III rectal adenocarcinoma, status post combined chemotherapy and radiation with infusional 5-FU.  Residual malignancy is [id number].  Status post low anterior resection with low pelvic stapled anastomosis and diverting loop ileostomy.  Status post adjuvant chemotherapy with FOLFOX 6.  Patient is on surveillance protocol  2. B12 deficiency, on oral B12 supplement.  We will check B12 level today  3. Mild chemo-induced neuropathy.:  Stable  4. Possible AV malformation in the  liver.  MRI of the liver from August 2016 did not reveal any lesions.  Surveillance imaging  5. Thrombocytopenia.  Status post bone marrow aspiration and biopsy with negative findings.  Platelets remained stable  6. Status post ileostomy reversal  7. Rectal stricture.  Stable  8. Diverting colostomy due to chronic colonic obstruction from rectal stricture.    9. Cardiomyopathy with EF of 30 to 35% on ECHO done 8/7/2020.  Patient encouraged to follow-up with his cardiologist  10. Liver lesion likely benign: Reevaluate with CTs    PLANS:     CBC, CMP and CEA as well as B12 level today  Schedule patient for next CT scans at the next visit in February 2022  Patient will also follow up with the rest of his physicians for his ongoing medical problems.     He has a history of B12 deficiency. Patient will transition to oral B12 supplementation.  I will check his levels periodically.  I explained the above to patient and his niece who is present today.     Patient to follow-up with Dr. Britton  his cardiologist for cardiomyopathy  Follow-up with PCP           I have reviewed labs results, imaging, vitals, and medications with the patient today. Will follow up in 6 months with me.      Patient verbalized understanding and is in agreement of the above plan.

## 2021-08-09 ENCOUNTER — OFFICE VISIT (OUTPATIENT)
Dept: ONCOLOGY | Facility: CLINIC | Age: 61
End: 2021-08-09

## 2021-08-09 ENCOUNTER — LAB (OUTPATIENT)
Dept: LAB | Facility: HOSPITAL | Age: 61
End: 2021-08-09

## 2021-08-09 VITALS
BODY MASS INDEX: 23.23 KG/M2 | HEIGHT: 67 IN | DIASTOLIC BLOOD PRESSURE: 98 MMHG | TEMPERATURE: 97.9 F | HEART RATE: 69 BPM | RESPIRATION RATE: 18 BRPM | WEIGHT: 148 LBS | SYSTOLIC BLOOD PRESSURE: 146 MMHG

## 2021-08-09 DIAGNOSIS — Z85.048 HISTORY OF RECTAL CANCER: ICD-10-CM

## 2021-08-09 DIAGNOSIS — R93.5 ABNORMAL ABDOMINAL CT SCAN: Primary | ICD-10-CM

## 2021-08-09 DIAGNOSIS — K76.9 LIVER LESION: ICD-10-CM

## 2021-08-09 DIAGNOSIS — R93.5 ABNORMAL ABDOMINAL CT SCAN: ICD-10-CM

## 2021-08-09 PROBLEM — R94.31 ABNORMAL EKG: Status: ACTIVE | Noted: 2018-10-02

## 2021-08-09 PROBLEM — R06.00 DYSPNEA: Status: ACTIVE | Noted: 2017-01-09

## 2021-08-09 PROBLEM — I10 HYPERTENSION: Status: ACTIVE | Noted: 2017-01-09

## 2021-08-09 PROBLEM — K62.9 ANORECTAL DISORDER: Status: ACTIVE | Noted: 2017-02-22

## 2021-08-09 PROBLEM — I42.9 CARDIOMYOPATHY (HCC): Status: ACTIVE | Noted: 2019-03-07

## 2021-08-09 LAB
ALBUMIN SERPL-MCNC: 4 G/DL (ref 3.5–5.2)
ALBUMIN/GLOB SERPL: 1.6 G/DL
ALP SERPL-CCNC: 92 U/L (ref 39–117)
ALT SERPL W P-5'-P-CCNC: 15 U/L (ref 1–41)
ANION GAP SERPL CALCULATED.3IONS-SCNC: 10 MMOL/L (ref 5–15)
AST SERPL-CCNC: 16 U/L (ref 1–40)
BASOPHILS # BLD AUTO: 0.03 10*3/MM3 (ref 0–0.2)
BASOPHILS NFR BLD AUTO: 0.6 % (ref 0–1.5)
BILIRUB SERPL-MCNC: 0.9 MG/DL (ref 0–1.2)
BUN SERPL-MCNC: 7 MG/DL (ref 8–23)
BUN/CREAT SERPL: 6.6 (ref 7–25)
CALCIUM SPEC-SCNC: 8.6 MG/DL (ref 8.6–10.5)
CEA SERPL-MCNC: 2.6 NG/ML
CHLORIDE SERPL-SCNC: 105 MMOL/L (ref 98–107)
CO2 SERPL-SCNC: 26 MMOL/L (ref 22–29)
CREAT SERPL-MCNC: 1.06 MG/DL (ref 0.76–1.27)
DEPRECATED RDW RBC AUTO: 45.6 FL (ref 37–54)
EOSINOPHIL # BLD AUTO: 0.1 10*3/MM3 (ref 0–0.4)
EOSINOPHIL NFR BLD AUTO: 2.1 % (ref 0.3–6.2)
ERYTHROCYTE [DISTWIDTH] IN BLOOD BY AUTOMATED COUNT: 13.7 % (ref 12.3–15.4)
GFR SERPL CREATININE-BSD FRML MDRD: 71 ML/MIN/1.73
GLOBULIN UR ELPH-MCNC: 2.5 GM/DL
GLUCOSE SERPL-MCNC: 91 MG/DL (ref 65–99)
HCT VFR BLD AUTO: 44 % (ref 37.5–51)
HGB BLD-MCNC: 14.6 G/DL (ref 13–17.7)
LYMPHOCYTES # BLD AUTO: 0.94 10*3/MM3 (ref 0.7–3.1)
LYMPHOCYTES NFR BLD AUTO: 19.8 % (ref 19.6–45.3)
MCH RBC QN AUTO: 30.5 PG (ref 26.6–33)
MCHC RBC AUTO-ENTMCNC: 33.2 G/DL (ref 31.5–35.7)
MCV RBC AUTO: 92.1 FL (ref 79–97)
MONOCYTES # BLD AUTO: 0.51 10*3/MM3 (ref 0.1–0.9)
MONOCYTES NFR BLD AUTO: 10.7 % (ref 5–12)
NEUTROPHILS NFR BLD AUTO: 3.17 10*3/MM3 (ref 1.7–7)
NEUTROPHILS NFR BLD AUTO: 66.8 % (ref 42.7–76)
PLATELET # BLD AUTO: 156 10*3/MM3 (ref 140–450)
PMV BLD AUTO: 10.6 FL (ref 6–12)
POTASSIUM SERPL-SCNC: 4.3 MMOL/L (ref 3.5–5.2)
PROT SERPL-MCNC: 6.5 G/DL (ref 6–8.5)
RBC # BLD AUTO: 4.78 10*6/MM3 (ref 4.14–5.8)
SODIUM SERPL-SCNC: 141 MMOL/L (ref 136–145)
VIT B12 BLD-MCNC: <150 PG/ML (ref 211–946)
WBC # BLD AUTO: 4.75 10*3/MM3 (ref 3.4–10.8)

## 2021-08-09 PROCEDURE — 99214 OFFICE O/P EST MOD 30 MIN: CPT | Performed by: INTERNAL MEDICINE

## 2021-08-09 PROCEDURE — 80053 COMPREHEN METABOLIC PANEL: CPT | Performed by: INTERNAL MEDICINE

## 2021-08-09 PROCEDURE — 85025 COMPLETE CBC W/AUTO DIFF WBC: CPT

## 2021-08-09 PROCEDURE — 36415 COLL VENOUS BLD VENIPUNCTURE: CPT

## 2021-08-09 PROCEDURE — 82607 VITAMIN B-12: CPT | Performed by: INTERNAL MEDICINE

## 2021-08-09 PROCEDURE — 82378 CARCINOEMBRYONIC ANTIGEN: CPT | Performed by: INTERNAL MEDICINE

## 2021-08-10 ENCOUNTER — TELEPHONE (OUTPATIENT)
Dept: ONCOLOGY | Facility: CLINIC | Age: 61
End: 2021-08-10

## 2021-08-10 ENCOUNTER — DOCUMENTATION (OUTPATIENT)
Dept: ONCOLOGY | Facility: HOSPITAL | Age: 61
End: 2021-08-10

## 2021-08-10 PROBLEM — E53.8 B12 DEFICIENCY: Status: ACTIVE | Noted: 2021-08-10

## 2021-08-10 NOTE — TELEPHONE ENCOUNTER
Caller: Jose C Ordonez    Relationship: Emergency Contact    Best call back number: [phone]    What is the best time to reach you:   ANYTIME     Who are you requesting to speak with (clinical staff, provider,  specific staff member): DR BALDWIN NURSE    Do you know the name of the person who called:  JOSE C     What was the call regarding:   WANTED TO SPEAK WITH NURSE IN REGARDS TO PATIENT AND B12     Do you require a callback: YES      CAN LEAVE VOICEMAIL IF NO ANSWER

## 2021-08-10 NOTE — TELEPHONE ENCOUNTER
----- Message from Veronica Villanueva MD sent at 8/9/2021  5:20 PM EDT -----  Restart vitamin B12 1000 mcg IM daily for 3 days then vitamin B12 1000 mcg IM once a month for low B12 level.  Please let his niece know.  He can stop oral B12 as he is not absorbing it.

## 2021-08-10 NOTE — TELEPHONE ENCOUNTER
Called pt to let him know that his B12 level was low and that Dr. Villanueva would like for him to start B12 injections and stop the oral B12. He stated that he has not been taking oral B12. I told him that I would pass this on to Dr. Villanueva and see what she recommends. He verbalized understanding.

## 2021-08-16 ENCOUNTER — HOSPITAL ENCOUNTER (OUTPATIENT)
Dept: ONCOLOGY | Facility: HOSPITAL | Age: 61
Setting detail: INFUSION SERIES
Discharge: HOME | End: 2021-08-16

## 2021-08-16 VITALS — HEIGHT: 67 IN | BODY MASS INDEX: 23.73 KG/M2 | WEIGHT: 151.2 LBS

## 2021-08-16 DIAGNOSIS — E53.8 B12 DEFICIENCY: Primary | ICD-10-CM

## 2021-08-16 PROCEDURE — 25010000002 CYANOCOBALAMIN PER 1000 MCG: Performed by: INTERNAL MEDICINE

## 2021-08-16 PROCEDURE — 96372 THER/PROPH/DIAG INJ SC/IM: CPT

## 2021-08-16 RX ORDER — CYANOCOBALAMIN 1000 UG/ML
1000 INJECTION, SOLUTION INTRAMUSCULAR; SUBCUTANEOUS ONCE
Status: COMPLETED | OUTPATIENT
Start: 2021-08-16 | End: 2021-08-16

## 2021-08-16 RX ADMIN — CYANOCOBALAMIN 1000 MCG: 1000 INJECTION, SOLUTION INTRAMUSCULAR at 11:52

## 2021-08-17 ENCOUNTER — TELEPHONE (OUTPATIENT)
Dept: ONCOLOGY | Facility: CLINIC | Age: 61
End: 2021-08-17

## 2021-08-17 ENCOUNTER — HOSPITAL ENCOUNTER (OUTPATIENT)
Dept: ONCOLOGY | Facility: HOSPITAL | Age: 61
Setting detail: INFUSION SERIES
Discharge: HOME | End: 2021-08-17

## 2021-08-17 VITALS — HEIGHT: 67 IN | BODY MASS INDEX: 23.54 KG/M2 | WEIGHT: 150 LBS

## 2021-08-17 DIAGNOSIS — E53.8 B12 DEFICIENCY: Primary | ICD-10-CM

## 2021-08-17 PROCEDURE — 25010000002 CYANOCOBALAMIN PER 1000 MCG: Performed by: INTERNAL MEDICINE

## 2021-08-17 PROCEDURE — 96372 THER/PROPH/DIAG INJ SC/IM: CPT

## 2021-08-17 RX ORDER — CYANOCOBALAMIN 1000 UG/ML
1000 INJECTION, SOLUTION INTRAMUSCULAR; SUBCUTANEOUS ONCE
Status: COMPLETED | OUTPATIENT
Start: 2021-08-17 | End: 2021-08-17

## 2021-08-17 RX ADMIN — CYANOCOBALAMIN 1000 MCG: 1000 INJECTION, SOLUTION INTRAMUSCULAR at 11:40

## 2021-08-17 NOTE — TELEPHONE ENCOUNTER
When I called she expressed she was here in room 10. I went to her and spoke with her regarding the need for Dave to come in today and tomorrow for a B12 inj. We got him scheduled for both days and she had no other questions. They thought they already had appts but they were never put into the computer.

## 2021-08-18 ENCOUNTER — HOSPITAL ENCOUNTER (OUTPATIENT)
Dept: ONCOLOGY | Facility: HOSPITAL | Age: 61
Setting detail: INFUSION SERIES
Discharge: HOME | End: 2021-08-18

## 2021-08-18 VITALS — WEIGHT: 150.4 LBS | BODY MASS INDEX: 23.61 KG/M2 | HEIGHT: 67 IN

## 2021-08-18 DIAGNOSIS — E53.8 B12 DEFICIENCY: Primary | ICD-10-CM

## 2021-08-18 PROCEDURE — 96372 THER/PROPH/DIAG INJ SC/IM: CPT

## 2021-08-18 PROCEDURE — 25010000002 CYANOCOBALAMIN PER 1000 MCG: Performed by: INTERNAL MEDICINE

## 2021-08-18 RX ORDER — CYANOCOBALAMIN 1000 UG/ML
1000 INJECTION, SOLUTION INTRAMUSCULAR; SUBCUTANEOUS ONCE
Status: COMPLETED | OUTPATIENT
Start: 2021-08-18 | End: 2021-08-18

## 2021-08-18 RX ADMIN — CYANOCOBALAMIN 1000 MCG: 1000 INJECTION, SOLUTION INTRAMUSCULAR at 11:16

## 2021-09-16 ENCOUNTER — HOSPITAL ENCOUNTER (OUTPATIENT)
Dept: ONCOLOGY | Facility: HOSPITAL | Age: 61
Setting detail: INFUSION SERIES
Discharge: HOME | End: 2021-09-16

## 2021-09-16 VITALS — BODY MASS INDEX: 23.51 KG/M2 | HEIGHT: 67 IN | WEIGHT: 149.8 LBS

## 2021-09-16 DIAGNOSIS — E53.8 B12 DEFICIENCY: Primary | ICD-10-CM

## 2021-09-16 PROCEDURE — 25010000002 CYANOCOBALAMIN PER 1000 MCG: Performed by: INTERNAL MEDICINE

## 2021-09-16 PROCEDURE — 96372 THER/PROPH/DIAG INJ SC/IM: CPT

## 2021-09-16 RX ORDER — CYANOCOBALAMIN 1000 UG/ML
1000 INJECTION, SOLUTION INTRAMUSCULAR; SUBCUTANEOUS ONCE
Status: COMPLETED | OUTPATIENT
Start: 2021-09-16 | End: 2021-09-16

## 2021-09-16 RX ADMIN — CYANOCOBALAMIN 1000 MCG: 1000 INJECTION, SOLUTION INTRAMUSCULAR at 11:07

## 2021-09-20 RX ORDER — METOPROLOL SUCCINATE 25 MG/1
TABLET, EXTENDED RELEASE ORAL
Qty: 90 TABLET | Refills: 0 | Status: SHIPPED | OUTPATIENT
Start: 2021-09-20 | End: 2021-12-20

## 2021-09-20 NOTE — TELEPHONE ENCOUNTER
Rx Refill Note  Requested Prescriptions     Pending Prescriptions Disp Refills   • metoprolol succinate XL (TOPROL-XL) 25 MG 24 hr tablet [Pharmacy Med Name: METOPROLOL ER SUCCINATE 25MG TABS] 90 tablet 2     Sig: TAKE 1 TABLET BY MOUTH DAILY      Last office visit with prescribing clinician: 10/22/2020      Next office visit with prescribing clinician: 11/22/2021            Rox Montague MA  09/20/21, 08:51 EDT

## 2021-10-14 ENCOUNTER — HOSPITAL ENCOUNTER (OUTPATIENT)
Dept: ONCOLOGY | Facility: HOSPITAL | Age: 61
Setting detail: INFUSION SERIES
Discharge: HOME | End: 2021-10-14

## 2021-10-14 VITALS — HEIGHT: 67 IN | BODY MASS INDEX: 23.45 KG/M2 | WEIGHT: 149.4 LBS

## 2021-10-14 DIAGNOSIS — E53.8 B12 DEFICIENCY: Primary | ICD-10-CM

## 2021-10-14 PROCEDURE — 96372 THER/PROPH/DIAG INJ SC/IM: CPT

## 2021-10-14 PROCEDURE — 25010000002 CYANOCOBALAMIN PER 1000 MCG: Performed by: INTERNAL MEDICINE

## 2021-10-14 RX ORDER — CYANOCOBALAMIN 1000 UG/ML
1000 INJECTION, SOLUTION INTRAMUSCULAR; SUBCUTANEOUS ONCE
Status: COMPLETED | OUTPATIENT
Start: 2021-10-14 | End: 2021-10-14

## 2021-10-14 RX ADMIN — CYANOCOBALAMIN 1000 MCG: 1000 INJECTION, SOLUTION INTRAMUSCULAR at 10:49

## 2021-11-11 ENCOUNTER — HOSPITAL ENCOUNTER (OUTPATIENT)
Dept: ONCOLOGY | Facility: HOSPITAL | Age: 61
Setting detail: INFUSION SERIES
Discharge: HOME | End: 2021-11-11

## 2021-11-11 VITALS — WEIGHT: 150 LBS | HEIGHT: 67 IN | BODY MASS INDEX: 23.54 KG/M2

## 2021-11-11 DIAGNOSIS — E53.8 B12 DEFICIENCY: Primary | ICD-10-CM

## 2021-11-11 PROCEDURE — 25010000002 CYANOCOBALAMIN PER 1000 MCG: Performed by: INTERNAL MEDICINE

## 2021-11-11 PROCEDURE — 96372 THER/PROPH/DIAG INJ SC/IM: CPT

## 2021-11-11 RX ORDER — CYANOCOBALAMIN 1000 UG/ML
1000 INJECTION, SOLUTION INTRAMUSCULAR; SUBCUTANEOUS ONCE
Status: COMPLETED | OUTPATIENT
Start: 2021-11-11 | End: 2021-11-11

## 2021-11-11 RX ADMIN — CYANOCOBALAMIN 1000 MCG: 1000 INJECTION, SOLUTION INTRAMUSCULAR at 11:10

## 2021-11-22 ENCOUNTER — CLINICAL SUPPORT NO REQUIREMENTS (OUTPATIENT)
Dept: CARDIOLOGY | Facility: CLINIC | Age: 61
End: 2021-11-22

## 2021-11-22 ENCOUNTER — OFFICE VISIT (OUTPATIENT)
Dept: CARDIOLOGY | Facility: CLINIC | Age: 61
End: 2021-11-22

## 2021-11-22 VITALS
SYSTOLIC BLOOD PRESSURE: 155 MMHG | BODY MASS INDEX: 23.39 KG/M2 | DIASTOLIC BLOOD PRESSURE: 90 MMHG | OXYGEN SATURATION: 96 % | HEIGHT: 67 IN | HEART RATE: 65 BPM | WEIGHT: 149 LBS

## 2021-11-22 DIAGNOSIS — R73.9 HYPERGLYCEMIA: ICD-10-CM

## 2021-11-22 DIAGNOSIS — Z95.0 PACEMAKER: Primary | ICD-10-CM

## 2021-11-22 DIAGNOSIS — I42.0 DILATED CARDIOMYOPATHY (HCC): ICD-10-CM

## 2021-11-22 DIAGNOSIS — R00.1 BRADYCARDIA: ICD-10-CM

## 2021-11-22 DIAGNOSIS — I10 ESSENTIAL HYPERTENSION: ICD-10-CM

## 2021-11-22 PROCEDURE — 93000 ELECTROCARDIOGRAM COMPLETE: CPT | Performed by: INTERNAL MEDICINE

## 2021-11-22 PROCEDURE — 93280 PM DEVICE PROGR EVAL DUAL: CPT | Performed by: INTERNAL MEDICINE

## 2021-11-22 PROCEDURE — 99214 OFFICE O/P EST MOD 30 MIN: CPT | Performed by: INTERNAL MEDICINE

## 2021-11-22 NOTE — PROGRESS NOTES
Subjective:     Encounter Date:11/22/2021      Patient ID: Dave Aguilar is a 61 y.o. male.    Chief Complaint : Follow-up for pacemaker, hypertension, cardiomyopathy  History of Present Illness        This is a 61-year-old white male with PMH of    # recurrent syncope  # severe bradycardia, status post MRI safe [employer] permanent pacemaker 05/13/2015  # blood loss anemia, colorectal adeno CA on chemo and XRT  # mildly elevated glucose  # Rectal cancer status post chemo August 2015     here for  follow-up.  Patient denies any chest pain or shortness of breath  Patient's arterial blood pressure is 155/90, heart rate 60, O2 sat of 96% on room air.  Patient had an echocardiogram 8/7/2020 done showing cardiomyopathy LVEF of 30 to 35%.  Patient's labs from 3/25/2020 reveal UA without proteinuria.  CMP, CBC from 2/20/2020 normal, labs from 8/10/2020 revealed CMP normal except for glucose of 107.  Labs from 8/9/2021 reveal normal CBC and CMP       ASSESSMENT;    # CMP  #  hypertension  #. Jay, syncope, S/P ppm  #. elevated blood glucose     PLAN;    Continue medical management with aspirin, lisinopril and metoprolol  Advised patient to check blood pressure at home.  Call if it is elevated.  We will follow-up in CHF clinic in 6 months and follow-up with me in 1 year.  Follow-up in pacemaker clinic.  Patient had pacemaker checked which is functioning well.  Reviewed hyperglycemia with patient, advised to follow-up with PMD for hyperglycemia  Reviewed EKG with patient and family   Counseled on walking exercise.  Discussed about COVID-19 vaccination.  Patient already took both the doses.          ECG 12 Lead    Date/Time: 11/22/2021 2:09 PM  Performed by: Gino Britton MD  Authorized by: Gino Britton MD   Comparison: compared with previous ECG from 10/22/2020  Comparison to previous ECG: EKG done today reviewed/interpreted by me reveals 100% a paced rhythm with rate of 60 bpm with LVH, no  change compared to EKG from 10/22/2020              The following portions of the patient's history were reviewed and updated as appropriate: allergies, current medications, past family history, past medical history, past social history, past surgical history and problem list.    Assessment:         MDM     Diagnosis Plan   1. Pacemaker     2. Bradycardia     3. Essential hypertension     4. Dilated cardiomyopathy (HCC)     5. Hyperglycemia            Plan:               Past Medical History:  Past Medical History:   Diagnosis Date   • Bradycardia     Hx of bradycardia-[facility]ed from [facility]    • Pacemaker 05/2015   • Rectal cancer (HCC)      Past Surgical History:  Past Surgical History:   Procedure Laterality Date   • COLECTOMY PARTIAL / TOTAL  06/22/2017    partial colectomy with takedown of colostomy/new colostomy-Dr. Fairchild-Abstracted from [facility].   • COLOSTOMY REVISION  06/22/2017    Dr. Fairchild-Abstracted from [facility].    • ILEOSTOMY  06/02/2016    ileostomy reversal-Dr. Fairchild-Abstracted from [facility].    • LOW ANTERIOR BOWEL RESECTION  08/07/2015    Ileostomy-Abstracted from [facility]    • SIGMOIDOSCOPY  01/21/2017    with colostomy-Dr. Fairchild-Abstracted from [facility].    • VENOUS ACCESS DEVICE (PORT) INSERTION Right 05/2015    Subclavian infuse a port placement-Abstracted from [facility]    • VENOUS ACCESS DEVICE (PORT) REMOVAL  06/02/2016      Allergies:  No Known Allergies  Home Meds:  Current Meds:     Current Outpatient Medications:   •  aspirin (ASPIRIN ADULT LOW DOSE) 81 MG EC tablet, Daily., Disp: , Rfl:   •  lisinopril (PRINIVIL,ZESTRIL) 20 MG tablet, TAKE 1 TABLET BY MOUTH DAILY, Disp: 90 tablet, Rfl: 2  •  metoprolol succinate XL (TOPROL-XL) 25 MG 24 hr tablet, TAKE 1 TABLET BY MOUTH DAILY, Disp: 90 tablet, Rfl: 0  Social History:   Social History     Tobacco Use   • Smoking status: Never Smoker   • Smokeless tobacco: Never Used   Substance Use Topics   • Alcohol use: No      Family History:  Family History   Problem Relation  "Age of Onset   • Stroke Mother    • Stroke Father    • Heart attack Sister    • Heart disease Other    • Hypertension Other               Review of Systems   Cardiovascular: Negative for chest pain, leg swelling and palpitations.   Respiratory: Negative for shortness of breath.    Neurological: Negative for dizziness and numbness.     All other systems are negative         Objective:     Physical Exam  /90 (BP Location: Left arm, Patient Position: Sitting, Cuff Size: Adult)   Pulse 65   Ht 170.2 cm (67\")   Wt 67.6 kg (149 lb)   SpO2 96%   BMI 23.34 kg/m²   General:  Appears in no acute distress  Eyes: Sclera is anicteric,  conjunctiva is clear   HEENT:  No JVD.  No carotid bruits  Respiratory: Respirations regular and unlabored at rest.  Clear to auscultation  Cardiovascular: S1,S2 Regular rate and rhythm. No murmur, rub or gallop auscultated.   Extremities: No digital clubbing or cyanosis, no edema  Skin: Color pink. Skin warm and dry to touch. No rashes  No xanthoma  Neuro: Alert and awake, no lateralizing deficits appreciated    Lab Reviewed:         Gino Britton MD  11/22/2021 14:15 EST      Much of the above report is an electronic transcription/translation of the spoken language to printed text using Dragon Software. As such, the subtleties and finesse of the spoken language may permit erroneous, or at times, nonsensical words or phrases to be inadvertently transcribed; thus changes may be made at a later date to rectify these errors.         "

## 2021-12-09 ENCOUNTER — HOSPITAL ENCOUNTER (OUTPATIENT)
Dept: ONCOLOGY | Facility: HOSPITAL | Age: 61
Setting detail: INFUSION SERIES
Discharge: HOME | End: 2021-12-09

## 2021-12-09 VITALS — HEIGHT: 67 IN | WEIGHT: 147.8 LBS | BODY MASS INDEX: 23.2 KG/M2

## 2021-12-09 DIAGNOSIS — E53.8 B12 DEFICIENCY: Primary | ICD-10-CM

## 2021-12-09 PROCEDURE — 25010000002 CYANOCOBALAMIN PER 1000 MCG: Performed by: INTERNAL MEDICINE

## 2021-12-09 PROCEDURE — 96372 THER/PROPH/DIAG INJ SC/IM: CPT

## 2021-12-09 RX ORDER — CYANOCOBALAMIN 1000 UG/ML
1000 INJECTION, SOLUTION INTRAMUSCULAR; SUBCUTANEOUS ONCE
Status: COMPLETED | OUTPATIENT
Start: 2021-12-09 | End: 2021-12-09

## 2021-12-09 RX ADMIN — CYANOCOBALAMIN 1000 MCG: 1000 INJECTION, SOLUTION INTRAMUSCULAR at 11:37

## 2021-12-20 RX ORDER — LISINOPRIL 20 MG/1
20 TABLET ORAL DAILY
Qty: 90 TABLET | Refills: 2 | Status: SHIPPED | OUTPATIENT
Start: 2021-12-20 | End: 2022-09-19

## 2021-12-20 RX ORDER — METOPROLOL SUCCINATE 25 MG/1
TABLET, EXTENDED RELEASE ORAL
Qty: 90 TABLET | Refills: 0 | Status: SHIPPED | OUTPATIENT
Start: 2021-12-20 | End: 2022-03-21

## 2021-12-20 NOTE — TELEPHONE ENCOUNTER
Rx Refill Note  Requested Prescriptions     Pending Prescriptions Disp Refills   • metoprolol succinate XL (TOPROL-XL) 25 MG 24 hr tablet [Pharmacy Med Name: METOPROLOL ER SUCCINATE 25MG TABS] 90 tablet 0     Sig: TAKE 1 TABLET BY MOUTH DAILY   • lisinopril (PRINIVIL,ZESTRIL) 20 MG tablet [Pharmacy Med Name: LISINOPRIL 20MG TABLETS] 90 tablet 2     Sig: TAKE 1 TABLET BY MOUTH DAILY      Last office visit with prescribing clinician: 11/22/2021      Next office visit with prescribing clinician: 11/29/2022            Vi Abdi MA  12/20/21, 08:35 EST

## 2022-01-13 ENCOUNTER — HOSPITAL ENCOUNTER (OUTPATIENT)
Dept: ONCOLOGY | Facility: HOSPITAL | Age: 62
Setting detail: INFUSION SERIES
Discharge: HOME | End: 2022-01-13

## 2022-01-13 VITALS — TEMPERATURE: 96.9 F

## 2022-01-13 DIAGNOSIS — E53.8 B12 DEFICIENCY: Primary | ICD-10-CM

## 2022-01-13 PROCEDURE — 25010000002 CYANOCOBALAMIN PER 1000 MCG: Performed by: INTERNAL MEDICINE

## 2022-01-13 PROCEDURE — 96372 THER/PROPH/DIAG INJ SC/IM: CPT

## 2022-01-13 RX ORDER — CYANOCOBALAMIN 1000 UG/ML
1000 INJECTION, SOLUTION INTRAMUSCULAR; SUBCUTANEOUS ONCE
Status: COMPLETED | OUTPATIENT
Start: 2022-01-13 | End: 2022-01-13

## 2022-01-13 RX ADMIN — CYANOCOBALAMIN 1000 MCG: 1000 INJECTION, SOLUTION INTRAMUSCULAR at 10:18

## 2022-02-08 NOTE — PROGRESS NOTES
Hematology/Oncology Outpatient Follow Up    PATIENT NAME:Dave Aguilar  :1960  MRN: [id number]  PRIMARY CARE PHYSICIAN: Renato Foreman MD  REFERRING PHYSICIAN: Renato Foreman MD    Chief Complaint   Patient presents with   • Follow-up   • Colon Cancer        HISTORY OF PRESENT ILLNESS:     This is a 60-year-old male who was diagnosed with stage III rectal adenocarcinoma.  Patient had presented with rectal bleeding while admitted to the hospital on 5/14/15.  At the time he had a colonoscopy that showed a rectal mass at 10 cm with near obstruction.  Patient did not have any clinical signs of colon obstruction.  • He had CT scan of the abdomen and pelvis done which revealed no evidence of liver involvement, but he had a rectal mass measuring 5.5 x 5.2 cm with wall thickening.  There were nonpathologically enlarged lymph nodes seen in the perirectal area.  One of the lymph nodes measured 5 mm.  He also had a CT scan of the chest which showed no acute abnormality within the chest.    • MRI of the pelvis was subsequently done and revealed rectal mass with viscerale tissue involvement and mildly enlarged perirectal lymphadenopathy.    • 5/8/15 - Pathology from rectal mass biopsy showed invasive, moderately differentiated adenocarcinoma, fragments of tubular adenoma.    • Due to significant amount of bleeding, patient had consultation with Dr. Whittaker who recommended a course of 9 Gy to be delivered in 3 fractions to control rectal bleeding and subsequently patient will be treated with daily XRT administered at 1.8 Gy per fraction for a total of 45 Gy along with concurrent chemotherapy.    • 5/9/15 - Serum CEA was measured at 10.   • 5/13/15 - Patient had permanent pacemaker implantation for syncope bradycardia secondary to sinus node dysfunction.   • Patient has been initiated on combined chemotherapy and radiation with continuous infusion 5-FU at 225 mg/M2 daily throughout the course of radiation  treatment.  His treatment was initiated on 5/18/15.    • 5/21/15 - PET/CT scan showed increased uptake in the rectum as expected with SUV of 11.98.  There was no evidence of local lymphadenopathy or hypermetabolic activity in the pelvis or inguinal nodes.  There was mild swirling of the distal mesentries which is totally asymptomatic.  No evidence of distant metastases was otherwise noted.    • 6/18/15 - Patient completed his neoadjuvant chemotherapy and radiation.    • 7/29/15 - Patient had an MRI of the abdomen and pelvis.  This revealed mass in the upper part of the rectum measuring 4.5 cm, smaller than on the prior exam.  The two perirectal nodes which were described originally appeared smaller.  The rest of his abdomen was unremarkable.    • 8/7/15 - Patient was taken to the operating room by Dr. Maria L Fairchild and underwent low anterior resection with low pelvic stapled anastomosis.  Mobilization of the splenic flexure and diverting loop ileostomy.  Pathology revealed moderately differentiated adenocarcinoma.  The tumor measured 3.5 cm.  The tumor was located above the peritoneal reflection.  There was no evidence of perforation.  There was no evidence of microsatellite instability.  Tumor invaded through muscularis propria into the subserosal adipose tissue.  There was no evidence of lymphovascular invasion, but there was evidence of perineural invasion.  All margins were negative.  Distance to the closest margin was 3 cm.  Pathologic stage is [id number].  Total of 23 lymph nodes were examined.  All were negative for metastatic carcinoma.    • 9/14/15 - Patient was initiated on cycle 1, day 1 of adjuvant FOLFOX 6.    • 10/26/15 - Treatment #4 of FOLFOX 6.  • 12/14/15 - Patient had cycle 7 of adjuvant FOLFOX.  • 2/8/16 - Patient had cycle 10 of FOLFOX 6.   • 3/7/16 - Patient received cycle 12 of FOLFOX 6.   • 3/17/16 - Patient was admitted to the hospital with syncopal episode.  Patient was found to be hypotensive in  the emergency room.  He was resuscitated with IV fluids.  He also had evidence of hepatic dysfunction with total bilirubin elevated to 5 and elevated liver function tests.    • 3/17/16 - During the hospitalization he had a CT scan of the abdomen and pelvis.  This revealed new 3.3 x 1.2 cm nonspecific fluid collection in the perirectal tissue.  There was also a 1.2 cm hypodense lesion in the right hepatic lobe.  MRI was recommended.  There was nonspecific thickening of the gallbladder wall and there was new mild hydronephrosis bilaterally.  Subsequently patient had an ultrasound of the abdomen which showed small amount of biliary sludge in the gallbladder, but no gallstones.  No definite findings to suggest cholecystitis.  CT scan of the head was negative for acute intracranial process.    • 3/18/16 - MRI of the abdomen with contrast revealed the area in the liver was atypical for colorectal metastases and this was thought to be most likely due to AV malformation.  Follow up MRI in three to six months was recommended.  The right hydronephrosis had resolved, but there was mild persistent hydronephrosis on the left.  • Patient was seen by Dr. Fairchild who was not too concerned about the perirectal fluid collection, but recommended antibiotics and rescanning in a few weeks to reevaluate.  Patient was also seen by Dr. Feldman with Urology service.  His bilateral hydronephrosis improved.  Throughout the hospitalization, patient also developed pancytopenia with leukopenia despite Neulasta.  During the course of his hospitalization he was noted to be hypotensive.  Patient was placed on Midodrine.    • 4/15/16 - CT scan of the abdomen and pelvis.  This basically showed persistent presacral soft tissue thickening with central fluid component.  Mild nonspecific thickening of the wall of the colon.    • 5/18/16 - PET/CT scan was essentially unremarkable.  No evidence of recurrent disease or increased activity in the abdomen or pelvis  to suggest metastatic disease.    • 6/2/16 - Colonoscopy and reversal of ileostomy.  The colonoscopy was essentially unremarkable.    • 6/17/16 - CT scan of the abdomen which showed evidence of post-op changes.  There was a 1.2 cm enhancing lesion in the posterior right hepatic lobe, stable in size.  Potential ileus.    • 8/17/16 - Patient had MRI of the liver which did not reveal any lesions in the liver.  No abnormal enhancing liver lesions to indicate metastatic disease were seen.  There was an abundant amount of stool throughout markedly dilated colon.  Patient has severe ileus with fecal impaction.  Patient has since then been seen by Dr. Fairchild.  He has another appointment scheduled for next week.    • 12/6/16 - CEA 1.8.  Chemistry panel:  BUN 4, creatinine 0.9.  • Patient was admitted to the hospital in January 2017.  At the time he underwent a diverting colostomy due to chronic rectal stricture resulting in colonic distention and small bowel distention.    • 1/19/17 - CT scan of the abdomen and pelvis which showed marked gaseous distention of the majority of the small bowel and entire colon.    • 3/21/17 - Patient had a colonoscopy per rectum and also stoma with dilatation of rectal stricture.  There were no masses identified.  There was mild narrowing, but no critical stricture of the rectum.  There was evidence of poor sphincter tone and rigidity of the tissues of the lower rectum.  Anal manometry was recommended by Dr. Faicrhild to further evaluate the anal rectal function and decision for either reversal of colostomy or revision of his colostomy due to retraction will be made at that time.  • 4/13/17 - CT scan of the chest which showed stable minimal biapical pleural parenchymal scarring.  Otherwise the lungs were clear.  There were no pathologically enlarged intrathoracic lymph nodes identified.  Hepatic steatosis.    • 6/20/17 - Patient had an CHICHI which was negative.  Haptoglobin was normal at 97.  SPEP  immunofixation did not reveal any monoclonal protein.  Retic count normal at 1.06.  Folate normal at 16.  .  BUN 6, creatinine 0.9.  Methylmalonic acid was normal at 139.  PT 4.8, INR 1, PTT 29.2, fibrinogen normal at 324.    • Patient had CT scan of the chest, abdomen and pelvis on 1/8/18.  There was no evidence of recurrent disease in the chest, abdomen or pelvis.   • 5/18/18 - Bone marrow aspiration and biopsy showed normocellular bone marrow with adequate trilineage hematopoiesis.  There was no evidence of myeloproliferative disease, myelodysplastic disease.   Flow cytometry was negative.  Cytogenetics was normal male karyotype.  Megakaryocytes are adequate in number without any cytologic atypia.      • 10/2/18 - Patient was seen by Dr. Britton who ordered a 2D echocardiogram on 10/8/18.  EF was noted to be 25%.    • 1/24/19 - CT scan of the chest, abdomen and pelvis which did not show any evidence of metastatic disease in the abdomen.  But there was a 1 cm contrast enhancing mass in the right lobe of the liver, which could represent a hemangioma, but metastatic disease cannot be completely excluded.  Dedicated MRI of the liver has been recommended to further evaluate.    • 3/1/19 - MRI of the abdomen:  Spoke with Dr. Nicolette Agosto, the radiologist.  There was a 7 mm of focus of enhancement in the dome of the liver, which is new compared to prior imaging.  Does not have the typical MR appearance of metastatic lesion, but due to the fact that it is new, is worrisome.  Also according to Dr. Agosto, its location would be very difficult for image guided biopsy and also too small to be characterized better on a PET scan.  Therefore, follow up MRI of the liver was recommended in about three to four months.  The lesion is very close to the diaphragm which makes it more difficult to approach.  Patient will be notified and a follow up MRI will be scheduled.   • 5/8/19 - Colonoscopy revealed no polyps or recurrent  neoplasms seen.    • 5/30/19 - MRI of the abdomen which showed an enhancing lesion measuring 9 mm on the right hepatic lobe, unchanged from January 2019 CT.  Continued surveillance was recommended.  Of note this lesion is new from MRI of the abdomen from 2016.  Imaging characteristics are nonspecific.  • 6/3/19- CEA  1.3, bun 5, creat 1.1  • 11/7/2019-patient had MRI of the abdomen this showed a stable 9 mm lesion in the hepatic lobe.  It does not have typical enhancement characteristics of malignancy or metastatic disease.  Benign etiology such as hemangioma is favored follow-up in 6 to 12 months was recommended.  • 2/14/2020 patient had CT scan of the chest, abdomen and pelvis, there was no clear evidence of metastatic disease.  Stable 12 mm right liver lesion felt to represent a benign liver lesion.  There is thickening of the bladder wall and postop changes.    Past Medical History:   Diagnosis Date   • Bradycardia     Hx of bradycardia-Abstracted from [facility]    • Pacemaker 05/2015   • Rectal cancer (HCC)        Past Surgical History:   Procedure Laterality Date   • COLECTOMY PARTIAL / TOTAL  06/22/2017    partial colectomy with takedown of colostomy/new colostomy-Dr. Fairchild-[facility]ed from [facility]   • COLOSTOMY REVISION  06/22/2017    Dr. Fairchild-Abstracted from [facility]    • ILEOSTOMY  06/02/2016    ileostomy reversal-Dr. Fairchild-Abstracted from [facility]    • LOW ANTERIOR BOWEL RESECTION  08/07/2015    Ileostomy-Abstracted from [facility]    • SIGMOIDOSCOPY  01/21/2017    with colostomy-Dr. Fairchild-Abstracted from [facility]    • VENOUS ACCESS DEVICE (PORT) INSERTION Right 05/2015    Subclavian infuse a port placement-Abstracted from [facility]    • VENOUS ACCESS DEVICE (PORT) REMOVAL  06/02/2016         Current Outpatient Medications:   •  aspirin (ASPIRIN ADULT LOW DOSE) 81 MG EC tablet, Daily., Disp: , Rfl:   •  lisinopril (PRINIVIL,ZESTRIL) 20 MG tablet, TAKE 1 TABLET BY MOUTH DAILY, Disp: 90 tablet, Rfl: 2  •  metoprolol succinate XL  "(TOPROL-XL) 25 MG 24 hr tablet, TAKE 1 TABLET BY MOUTH DAILY, Disp: 90 tablet, Rfl: 0    No Known Allergies    Family History   Problem Relation Age of Onset   • Stroke Mother    • Stroke Father    • Heart attack Sister    • Heart disease Other    • Hypertension Other        Cancer-related family history is not on file.    Social History     Tobacco Use   • Smoking status: Never Smoker   • Smokeless tobacco: Never Used   Substance Use Topics   • Alcohol use: No   • Drug use: No       I have reviewed and confirmed the accuracy of the patient's history: Chief complaint, HPI and ROS as entered by the MA/LPN/RN. Veronica Villanueva MD 02/09/22       SUBJECTIVE:    Patient is here today for routine follow-up and does not have any specific complaints.          REVIEW OF SYSTEMS:    Review of Systems   Constitutional: Negative for chills and fever.   HENT: Negative for ear pain, mouth sores, nosebleeds and sore throat.    Eyes: Negative for photophobia and visual disturbance.   Respiratory: Negative for wheezing and stridor.    Cardiovascular: Negative for chest pain and palpitations.   Gastrointestinal: Negative for abdominal pain, diarrhea, nausea and vomiting.   Endocrine: Negative for cold intolerance and heat intolerance.   Genitourinary: Negative for dysuria and hematuria.   Musculoskeletal: Negative for joint swelling and neck stiffness.   Skin: Negative for color change and rash.   Neurological: Negative for seizures and syncope.   Hematological: Negative for adenopathy.        No obvious bleeding   Psychiatric/Behavioral: Negative for agitation, confusion and hallucinations.         OBJECTIVE:    Vitals:    02/09/22 1021   BP: 145/98   Pulse: 83   Resp: 18   Temp: 97.2 °F (36.2 °C)   Weight: 66.2 kg (146 lb)   Height: 170.2 cm (67\")   PainSc: 0-No pain       ECOG  (1) Restricted in physically strenuous activity, ambulatory and able to do work of light nature    Physical Exam   Constitutional: He is oriented to " person, place, and time. No distress.   HENT:   Head: Normocephalic and atraumatic.   Eyes: Conjunctivae are normal. Right eye exhibits no discharge. Left eye exhibits no discharge. No scleral icterus.   Neck: No thyromegaly present.   Cardiovascular: Normal rate, regular rhythm and normal heart sounds. Exam reveals no gallop and no friction rub.   Pulmonary/Chest: Effort normal. No stridor. No respiratory distress. He has no wheezes.   Abdominal: Soft. Bowel sounds are normal. He exhibits no mass. There is no abdominal tenderness. There is no rebound and no guarding.   Colostomy in place   Musculoskeletal: Normal range of motion. No tenderness.   Lymphadenopathy:     He has no cervical adenopathy.   Neurological: He is alert and oriented to person, place, and time. He exhibits normal muscle tone.   Skin: Skin is warm. No rash noted. He is not diaphoretic. No erythema.   Psychiatric: His behavior is normal.   Nursing note and vitals reviewed.    Physical exam as documented above    I have reexamined the patient and the results are consistent with the previously documented exam. Veronica María Villanueva MD     RECENT LABS    WBC   Date Value Ref Range Status   02/09/2022 5.36 3.40 - 10.80 10*3/mm3 Final     RBC   Date Value Ref Range Status   02/09/2022 4.93 4.14 - 5.80 10*6/mm3 Final     Hemoglobin   Date Value Ref Range Status   02/09/2022 15.2 13.0 - 17.7 g/dL Final     Hematocrit   Date Value Ref Range Status   02/09/2022 44.4 37.5 - 51.0 % Final     MCV   Date Value Ref Range Status   02/09/2022 90.1 79.0 - 97.0 fL Final     MCH   Date Value Ref Range Status   02/09/2022 30.8 26.6 - 33.0 pg Final     MCHC   Date Value Ref Range Status   02/09/2022 34.2 31.5 - 35.7 g/dL Final     RDW   Date Value Ref Range Status   02/09/2022 13.8 12.3 - 15.4 % Final     RDW-SD   Date Value Ref Range Status   02/09/2022 45.0 37.0 - 54.0 fl Final     MPV   Date Value Ref Range Status   02/09/2022 11.7 6.0 - 12.0 fL Final      Platelets   Date Value Ref Range Status   02/09/2022 127 (L) 140 - 450 10*3/mm3 Final     Neutrophil %   Date Value Ref Range Status   02/09/2022 70.5 42.7 - 76.0 % Final     Lymphocyte %   Date Value Ref Range Status   02/09/2022 17.9 (L) 19.6 - 45.3 % Final     Monocyte %   Date Value Ref Range Status   02/09/2022 9.1 5.0 - 12.0 % Final     Eosinophil %   Date Value Ref Range Status   02/09/2022 2.1 0.3 - 6.2 % Final     Basophil %   Date Value Ref Range Status   02/09/2022 0.4 0.0 - 1.5 % Final     Neutrophils, Absolute   Date Value Ref Range Status   02/09/2022 3.78 1.70 - 7.00 10*3/mm3 Final     Lymphocytes, Absolute   Date Value Ref Range Status   02/09/2022 0.96 0.70 - 3.10 10*3/mm3 Final     Monocytes, Absolute   Date Value Ref Range Status   02/09/2022 0.49 0.10 - 0.90 10*3/mm3 Final     Eosinophils, Absolute   Date Value Ref Range Status   02/09/2022 0.11 0.00 - 0.40 10*3/mm3 Final     Basophils, Absolute   Date Value Ref Range Status   02/09/2022 0.02 0.00 - 0.20 10*3/mm3 Final     nRBC   Date Value Ref Range Status   06/30/2017 0 0 /100[WBCs] Final       Lab Results   Component Value Date    GLUCOSE 91 08/09/2021    BUN 7 (L) 08/09/2021    CREATININE 1.06 08/09/2021    EGFRIFNONA 71 08/09/2021    EGFRIFAFRI >60 05/30/2019    BCR 6.6 (L) 08/09/2021    K 4.3 08/09/2021    CO2 26.0 08/09/2021    CALCIUM 8.6 08/09/2021    ALBUMIN 4.00 08/09/2021    LABIL2 1.4 06/03/2019    AST 16 08/09/2021    ALT 15 08/09/2021           ASSESSMENT:    1. Stage III rectal adenocarcinoma, status post combined chemotherapy and radiation with infusional 5-FU.  Residual malignancy is [id number].  Status post low anterior resection with low pelvic stapled anastomosis and diverting loop ileostomy.  Status post adjuvant chemotherapy with FOLFOX 6.  Patient is on surveillance protocol  2. B12 deficiency, on oral B12 supplement.  We will check B12 level today  3. Mild chemo-induced neuropathy.:  Stable  4. Possible AV malformation  in the liver.  MRI of the liver from August 2016 did not reveal any lesions.  Surveillance imaging  5. Thrombocytopenia.  Status post bone marrow aspiration and biopsy with negative findings.  Platelets remained stable  6. Status post ileostomy reversal  7. Rectal stricture.  Stable  8. Diverting colostomy due to chronic colonic obstruction from rectal stricture.    9. Cardiomyopathy with EF of 30 to 35% on ECHO done 8/7/2020.  Patient encouraged to follow-up with his cardiologist  10. Liver lesion likely benign: Reevaluate with CTs    PLANS:     CBC, CMP and CEA today    Schedule patient for next CT scans at the next visit in February 2022. Will schedule CT scans today    Patient will also follow up with the rest of his physicians for his ongoing medical problems.     Give B12 injection today.    Switch to oral B1 supplements    Check B12 level Q 6 months      Patient to follow-up with Dr. Britton  his cardiologist for cardiomyopathy  Follow-up with PCP           I have reviewed labs results, imaging, vitals, and medications with the patient today. Will follow up in 6 months with me.      Patient verbalized understanding and is in agreement of the above plan.        I spent 30 total minutes, face-to-face, caring for Dave pedersen.  90% of this time involved counseling and/or coordination of care as documented within this note.

## 2022-02-09 ENCOUNTER — OFFICE VISIT (OUTPATIENT)
Dept: ONCOLOGY | Facility: CLINIC | Age: 62
End: 2022-02-09

## 2022-02-09 ENCOUNTER — LAB (OUTPATIENT)
Dept: LAB | Facility: HOSPITAL | Age: 62
End: 2022-02-09

## 2022-02-09 VITALS
WEIGHT: 146 LBS | HEART RATE: 83 BPM | RESPIRATION RATE: 18 BRPM | BODY MASS INDEX: 22.91 KG/M2 | SYSTOLIC BLOOD PRESSURE: 145 MMHG | DIASTOLIC BLOOD PRESSURE: 98 MMHG | HEIGHT: 67 IN | TEMPERATURE: 97.2 F

## 2022-02-09 DIAGNOSIS — C18.9 MALIGNANT NEOPLASM OF COLON, UNSPECIFIED PART OF COLON: Primary | ICD-10-CM

## 2022-02-09 DIAGNOSIS — R93.5 ABNORMAL ABDOMINAL CT SCAN: Primary | ICD-10-CM

## 2022-02-09 DIAGNOSIS — C18.9 MALIGNANT NEOPLASM OF COLON, UNSPECIFIED PART OF COLON: ICD-10-CM

## 2022-02-09 LAB
ALBUMIN SERPL-MCNC: 4.1 G/DL (ref 3.5–5.2)
ALBUMIN/GLOB SERPL: 1.4 G/DL
ALP SERPL-CCNC: 101 U/L (ref 39–117)
ALT SERPL W P-5'-P-CCNC: 12 U/L (ref 1–41)
ANION GAP SERPL CALCULATED.3IONS-SCNC: 9 MMOL/L (ref 5–15)
AST SERPL-CCNC: 19 U/L (ref 1–40)
BASOPHILS # BLD AUTO: 0.02 10*3/MM3 (ref 0–0.2)
BASOPHILS NFR BLD AUTO: 0.4 % (ref 0–1.5)
BILIRUB SERPL-MCNC: 0.8 MG/DL (ref 0–1.2)
BUN SERPL-MCNC: 9 MG/DL (ref 8–23)
BUN/CREAT SERPL: 7.8 (ref 7–25)
CALCIUM SPEC-SCNC: 8.8 MG/DL (ref 8.6–10.5)
CEA SERPL-MCNC: 1.47 NG/ML
CHLORIDE SERPL-SCNC: 105 MMOL/L (ref 98–107)
CO2 SERPL-SCNC: 24 MMOL/L (ref 22–29)
CREAT SERPL-MCNC: 1.15 MG/DL (ref 0.76–1.27)
DEPRECATED RDW RBC AUTO: 45 FL (ref 37–54)
EOSINOPHIL # BLD AUTO: 0.11 10*3/MM3 (ref 0–0.4)
EOSINOPHIL NFR BLD AUTO: 2.1 % (ref 0.3–6.2)
ERYTHROCYTE [DISTWIDTH] IN BLOOD BY AUTOMATED COUNT: 13.8 % (ref 12.3–15.4)
GFR SERPL CREATININE-BSD FRML MDRD: 64 ML/MIN/1.73
GLOBULIN UR ELPH-MCNC: 2.9 GM/DL
GLUCOSE SERPL-MCNC: 116 MG/DL (ref 65–99)
HCT VFR BLD AUTO: 44.4 % (ref 37.5–51)
HGB BLD-MCNC: 15.2 G/DL (ref 13–17.7)
HOLD SPECIMEN: NORMAL
HOLD SPECIMEN: NORMAL
LYMPHOCYTES # BLD AUTO: 0.96 10*3/MM3 (ref 0.7–3.1)
LYMPHOCYTES NFR BLD AUTO: 17.9 % (ref 19.6–45.3)
MCH RBC QN AUTO: 30.8 PG (ref 26.6–33)
MCHC RBC AUTO-ENTMCNC: 34.2 G/DL (ref 31.5–35.7)
MCV RBC AUTO: 90.1 FL (ref 79–97)
MONOCYTES # BLD AUTO: 0.49 10*3/MM3 (ref 0.1–0.9)
MONOCYTES NFR BLD AUTO: 9.1 % (ref 5–12)
NEUTROPHILS NFR BLD AUTO: 3.78 10*3/MM3 (ref 1.7–7)
NEUTROPHILS NFR BLD AUTO: 70.5 % (ref 42.7–76)
PLATELET # BLD AUTO: 127 10*3/MM3 (ref 140–450)
PMV BLD AUTO: 11.7 FL (ref 6–12)
POTASSIUM SERPL-SCNC: 4.2 MMOL/L (ref 3.5–5.2)
PROT SERPL-MCNC: 7 G/DL (ref 6–8.5)
RBC # BLD AUTO: 4.93 10*6/MM3 (ref 4.14–5.8)
SODIUM SERPL-SCNC: 138 MMOL/L (ref 136–145)
WBC NRBC COR # BLD: 5.36 10*3/MM3 (ref 3.4–10.8)

## 2022-02-09 PROCEDURE — 99214 OFFICE O/P EST MOD 30 MIN: CPT | Performed by: INTERNAL MEDICINE

## 2022-02-09 PROCEDURE — 82378 CARCINOEMBRYONIC ANTIGEN: CPT

## 2022-02-09 PROCEDURE — 36415 COLL VENOUS BLD VENIPUNCTURE: CPT | Performed by: INTERNAL MEDICINE

## 2022-02-09 PROCEDURE — 85025 COMPLETE CBC W/AUTO DIFF WBC: CPT | Performed by: INTERNAL MEDICINE

## 2022-02-09 PROCEDURE — 80053 COMPREHEN METABOLIC PANEL: CPT

## 2022-02-10 ENCOUNTER — APPOINTMENT (OUTPATIENT)
Dept: ONCOLOGY | Facility: HOSPITAL | Age: 62
End: 2022-02-10

## 2022-02-10 ENCOUNTER — HOSPITAL ENCOUNTER (OUTPATIENT)
Dept: ONCOLOGY | Facility: HOSPITAL | Age: 62
Setting detail: INFUSION SERIES
Discharge: HOME | End: 2022-02-10

## 2022-02-10 VITALS — BODY MASS INDEX: 23.07 KG/M2 | HEIGHT: 67 IN | WEIGHT: 147 LBS

## 2022-02-10 DIAGNOSIS — E53.8 B12 DEFICIENCY: Primary | ICD-10-CM

## 2022-02-10 PROCEDURE — 25010000002 CYANOCOBALAMIN PER 1000 MCG: Performed by: INTERNAL MEDICINE

## 2022-02-10 PROCEDURE — 96372 THER/PROPH/DIAG INJ SC/IM: CPT

## 2022-02-10 RX ORDER — CYANOCOBALAMIN 1000 UG/ML
1000 INJECTION, SOLUTION INTRAMUSCULAR; SUBCUTANEOUS ONCE
Status: COMPLETED | OUTPATIENT
Start: 2022-02-10 | End: 2022-02-10

## 2022-02-10 RX ADMIN — CYANOCOBALAMIN 1000 MCG: 1000 INJECTION INTRAMUSCULAR; SUBCUTANEOUS at 10:50

## 2022-02-24 ENCOUNTER — HOSPITAL ENCOUNTER (OUTPATIENT)
Dept: PET IMAGING | Facility: HOSPITAL | Age: 62
End: 2022-02-24

## 2022-02-24 ENCOUNTER — HOSPITAL ENCOUNTER (OUTPATIENT)
Dept: PET IMAGING | Facility: HOSPITAL | Age: 62
Discharge: HOME | End: 2022-02-24
Admitting: INTERNAL MEDICINE

## 2022-02-24 DIAGNOSIS — C18.9 MALIGNANT NEOPLASM OF COLON, UNSPECIFIED PART OF COLON: ICD-10-CM

## 2022-02-24 PROCEDURE — 71260 CT THORAX DX C+: CPT

## 2022-02-24 PROCEDURE — 0 IOPAMIDOL PER 1 ML: Performed by: INTERNAL MEDICINE

## 2022-02-24 PROCEDURE — 74177 CT ABD & PELVIS W/CONTRAST: CPT

## 2022-02-24 RX ADMIN — IOPAMIDOL 100 ML: 755 INJECTION, SOLUTION INTRAVENOUS at 10:51

## 2022-03-21 RX ORDER — METOPROLOL SUCCINATE 25 MG/1
TABLET, EXTENDED RELEASE ORAL
Qty: 90 TABLET | Refills: 1 | Status: SHIPPED | OUTPATIENT
Start: 2022-03-21 | End: 2022-09-15

## 2022-03-21 NOTE — TELEPHONE ENCOUNTER
Rx Refill Note  Requested Prescriptions     Pending Prescriptions Disp Refills   • metoprolol succinate XL (TOPROL-XL) 25 MG 24 hr tablet [Pharmacy Med Name: METOPROLOL ER SUCCINATE 25MG TABS] 90 tablet 0     Sig: TAKE 1 TABLET BY MOUTH DAILY      Last office visit with prescribing clinician: 11/22/2021      Next office visit with prescribing clinician: 11/29/2022            Rox Montague MA  03/21/22, 09:20 EDT

## 2022-03-22 ENCOUNTER — TELEPHONE (OUTPATIENT)
Dept: ONCOLOGY | Facility: CLINIC | Age: 62
End: 2022-03-22

## 2022-03-22 DIAGNOSIS — R93.5 ABNORMAL ABDOMINAL CT SCAN: Primary | ICD-10-CM

## 2022-03-22 NOTE — TELEPHONE ENCOUNTER
Called pt to ask about urinary symptoms. He denied having any. Lab appt scheduled for UA. Pt verbalized understanding.

## 2022-03-22 NOTE — TELEPHONE ENCOUNTER
----- Message from Veronica Villanueva MD sent at 3/18/2022  3:40 PM EDT -----  Ask about urinary symptoms.  UA with [facility].  Otherwise patient to keep his appointment as discussed

## 2022-03-23 ENCOUNTER — LAB (OUTPATIENT)
Dept: LAB | Facility: HOSPITAL | Age: 62
End: 2022-03-23

## 2022-03-23 DIAGNOSIS — R93.5 ABNORMAL ABDOMINAL CT SCAN: ICD-10-CM

## 2022-03-23 LAB
BILIRUB UR QL STRIP: NEGATIVE
CLARITY UR: CLEAR
COLOR UR: YELLOW
GLUCOSE UR STRIP-MCNC: NEGATIVE MG/DL
HGB UR QL STRIP.AUTO: NEGATIVE
KETONES UR QL STRIP: NEGATIVE
LEUKOCYTE ESTERASE UR QL STRIP.AUTO: NEGATIVE
NITRITE UR QL STRIP: NEGATIVE
PH UR STRIP.AUTO: 7 [PH] (ref 5–8)
PROT UR QL STRIP: NEGATIVE
SP GR UR STRIP: 1.01 (ref 1–1.03)
UROBILINOGEN UR QL STRIP: NORMAL

## 2022-03-23 PROCEDURE — 81003 URINALYSIS AUTO W/O SCOPE: CPT

## 2022-05-25 ENCOUNTER — CLINICAL SUPPORT NO REQUIREMENTS (OUTPATIENT)
Dept: CARDIOLOGY | Facility: CLINIC | Age: 62
End: 2022-05-25

## 2022-05-25 ENCOUNTER — OFFICE VISIT (OUTPATIENT)
Dept: CARDIOLOGY | Facility: CLINIC | Age: 62
End: 2022-05-25

## 2022-05-25 VITALS
SYSTOLIC BLOOD PRESSURE: 131 MMHG | OXYGEN SATURATION: 96 % | BODY MASS INDEX: 22.91 KG/M2 | DIASTOLIC BLOOD PRESSURE: 89 MMHG | HEIGHT: 67 IN | WEIGHT: 146 LBS | HEART RATE: 47 BPM

## 2022-05-25 DIAGNOSIS — Z95.0 PACEMAKER: Primary | ICD-10-CM

## 2022-05-25 DIAGNOSIS — R00.1 BRADYCARDIA: ICD-10-CM

## 2022-05-25 DIAGNOSIS — Z95.0 PACEMAKER: ICD-10-CM

## 2022-05-25 DIAGNOSIS — R00.1 BRADYCARDIA, SINUS: ICD-10-CM

## 2022-05-25 DIAGNOSIS — I42.0 DILATED CARDIOMYOPATHY: Primary | ICD-10-CM

## 2022-05-25 PROCEDURE — 93280 PM DEVICE PROGR EVAL DUAL: CPT | Performed by: INTERNAL MEDICINE

## 2022-05-25 PROCEDURE — 99213 OFFICE O/P EST LOW 20 MIN: CPT | Performed by: NURSE PRACTITIONER

## 2022-05-25 NOTE — PROGRESS NOTES
Subjective:     Encounter Date:  05/25/2022      Patient ID: Dave Aguilar is a 62 y.o. male.    Chief Complaint : 6 month follow-up for pacemaker, hypertension, cardiomyopathy    This is a 4-year-old white male with PMH of    # Dilated Cardiomyopathy   # history of recurrent syncope   # severe bradycardia, status post MRI safe [employer] permanent pacemaker 05/13/2015  # blood loss anemia, colorectal adeno CA on chemo and XRT  # mildly elevated glucose  # Rectal cancer status post chemo August 2015    Here for 6 month follow up.  Patient denies any chest pain, shortness of breath, palpitations or dizziness.  Patient does not have any edema.  He is tolerating his medications well.  Blood pressure today is 131/89, HR reading 47-70s likely due to PVCs on blood pressure machine.  We confirmed PVCs with pacemaker interrogation.        Patient had an echocardiogram 8/7/2020 done showing cardiomyopathy LVEF of 30 to 35%.      Labs from 2/9/2022 glucose elevated 116, bun 9 creatinine 1.15 potassium 4.2, plts 127             Procedures    The following portions of the patient's history were reviewed and updated as appropriate: allergies, current medications, past family history, past medical history, past social history, past surgical history and problem list.    Assessment:         MDM     Diagnosis Plan   1. Dilated cardiomyopathy (HCC)     2. Bradycardia, sinus     3. Pacemaker            Plan:         Continue currently medical treatment with aspirin, lisinopril and metoprolol   Blood pressure stable   Pacemaker was check today, functioning well.  PVCs noted -- asymptomatic     Keep scheduled follow up with Dr. Britton.             Past Medical History:  Past Medical History:   Diagnosis Date   • Bradycardia     Hx of bradycardia-[facility]ed from [facility]    • Pacemaker 05/2015   • Rectal cancer (HCC)      Past Surgical History:  Past Surgical History:   Procedure Laterality Date   • COLECTOMY PARTIAL / TOTAL   "06/22/2017    partial colectomy with takedown of colostomy/new colostomy-Dr. Fairchild-Abstracted from [facility].   • COLOSTOMY REVISION  06/22/2017    Dr. Fairchild-Abstracted from [facility].    • ILEOSTOMY  06/02/2016    ileostomy reversal-Dr. Fairchild-Abstracted from [facility].    • LOW ANTERIOR BOWEL RESECTION  08/07/2015    Ileostomy-[facility]ed from [facility]    • SIGMOIDOSCOPY  01/21/2017    with colostomy-Dr. Fairchild-Abstracted from [facility].    • VENOUS ACCESS DEVICE (PORT) INSERTION Right 05/2015    Subclavian infuse a port placement-Abstracted from [facility].    • VENOUS ACCESS DEVICE (PORT) REMOVAL  06/02/2016      Allergies:  No Known Allergies  Home Meds:  Current Meds:     Current Outpatient Medications:   •  aspirin (aspirin) 81 MG EC tablet, Daily., Disp: , Rfl:   •  lisinopril (PRINIVIL,ZESTRIL) 20 MG tablet, TAKE 1 TABLET BY MOUTH DAILY, Disp: 90 tablet, Rfl: 2  •  metoprolol succinate XL (TOPROL-XL) 25 MG 24 hr tablet, TAKE 1 TABLET BY MOUTH DAILY, Disp: 90 tablet, Rfl: 1  Social History:   Social History     Tobacco Use   • Smoking status: Never Smoker   • Smokeless tobacco: Never Used   Substance Use Topics   • Alcohol use: No      Family History:  Family History   Problem Relation Age of Onset   • Stroke Mother    • Stroke Father    • Heart attack Sister    • Heart disease Other    • Hypertension Other               Review of Systems   Cardiovascular: Negative for chest pain, leg swelling and palpitations.   Respiratory: Negative for shortness of breath.    Neurological: Negative for dizziness and numbness.     All other systems are negative         Objective:     Physical Exam  /89 (BP Location: Left arm, Patient Position: Sitting, Cuff Size: Adult)   Pulse (!) 47   Ht 170.2 cm (67\")   Wt 66.2 kg (146 lb)   SpO2 96%   BMI 22.87 kg/m²   General:  Appears in no acute distress  Eyes: Sclera is anicteric,  conjunctiva is clear   HEENT:  No JVD.   Respiratory: Respirations regular and unlabored at rest.  Clear to " auscultation  Cardiovascular: S1,S2 Regular rate and rhythm. No murmur, rub or gallop auscultated.   Extremities: No digital clubbing or cyanosis, no edema  Skin: Color pink. Skin warm and dry to touch. No rashes  No xanthoma  Neuro: Alert and awake, no lateralizing deficits appreciated    Lab Reviewed:         ANDRADE Orr  5/25/2022 14:07 EDT  Electronically signed by ANDRADE Orr, 05/25/22, 2:49 PM EDT.      Much of the above report is an electronic transcription/translation of the spoken language to printed text using Dragon Software. As such, the subtleties and finesse of the spoken language may permit erroneous, or at times, nonsensical words or phrases to be inadvertently transcribed; thus changes may be made at a later date to rectify these errors.

## 2022-08-05 NOTE — PROGRESS NOTES
Hematology/Oncology Outpatient Follow Up    PATIENT NAME:Dave Aguilar  :1960  MRN: [id number]  PRIMARY CARE PHYSICIAN: Renato Foreman MD  REFERRING PHYSICIAN: Renato Foreman MD    Chief Complaint   Patient presents with   • Follow-up     Malignant neoplasm of colon, unspecified part of colon (HCC)        HISTORY OF PRESENT ILLNESS:     This is a 60-year-old male who was diagnosed with stage III rectal adenocarcinoma.  Patient had presented with rectal bleeding while admitted to the hospital on 5/14/15.  At the time he had a colonoscopy that showed a rectal mass at 10 cm with near obstruction.  Patient did not have any clinical signs of colon obstruction.  • He had CT scan of the abdomen and pelvis done which revealed no evidence of liver involvement, but he had a rectal mass measuring 5.5 x 5.2 cm with wall thickening.  There were nonpathologically enlarged lymph nodes seen in the perirectal area.  One of the lymph nodes measured 5 mm.  He also had a CT scan of the chest which showed no acute abnormality within the chest.    • MRI of the pelvis was subsequently done and revealed rectal mass with viscerale tissue involvement and mildly enlarged perirectal lymphadenopathy.    • 5/8/15 - Pathology from rectal mass biopsy showed invasive, moderately differentiated adenocarcinoma, fragments of tubular adenoma.    • Due to significant amount of bleeding, patient had consultation with Dr. Whittaker who recommended a course of 9 Gy to be delivered in 3 fractions to control rectal bleeding and subsequently patient will be treated with daily XRT administered at 1.8 Gy per fraction for a total of 45 Gy along with concurrent chemotherapy.    • 5/9/15 - Serum CEA was measured at 10.   • 5/13/15 - Patient had permanent pacemaker implantation for syncope bradycardia secondary to sinus node dysfunction.   • Patient has been initiated on combined chemotherapy and radiation with continuous infusion 5-FU at 225 mg/M2  daily throughout the course of radiation treatment.  His treatment was initiated on 5/18/15.    • 5/21/15 - PET/CT scan showed increased uptake in the rectum as expected with SUV of 11.98.  There was no evidence of local lymphadenopathy or hypermetabolic activity in the pelvis or inguinal nodes.  There was mild swirling of the distal mesentries which is totally asymptomatic.  No evidence of distant metastases was otherwise noted.    • 6/18/15 - Patient completed his neoadjuvant chemotherapy and radiation.    • 7/29/15 - Patient had an MRI of the abdomen and pelvis.  This revealed mass in the upper part of the rectum measuring 4.5 cm, smaller than on the prior exam.  The two perirectal nodes which were described originally appeared smaller.  The rest of his abdomen was unremarkable.    • 8/7/15 - Patient was taken to the operating room by Dr. Maria L Fairchild and underwent low anterior resection with low pelvic stapled anastomosis.  Mobilization of the splenic flexure and diverting loop ileostomy.  Pathology revealed moderately differentiated adenocarcinoma.  The tumor measured 3.5 cm.  The tumor was located above the peritoneal reflection.  There was no evidence of perforation.  There was no evidence of microsatellite instability.  Tumor invaded through muscularis propria into the subserosal adipose tissue.  There was no evidence of lymphovascular invasion, but there was evidence of perineural invasion.  All margins were negative.  Distance to the closest margin was 3 cm.  Pathologic stage is [id number].  Total of 23 lymph nodes were examined.  All were negative for metastatic carcinoma.    • 9/14/15 - Patient was initiated on cycle 1, day 1 of adjuvant FOLFOX 6.    • 10/26/15 - Treatment #4 of FOLFOX 6.  • 12/14/15 - Patient had cycle 7 of adjuvant FOLFOX.  • 2/8/16 - Patient had cycle 10 of FOLFOX 6.   • 3/7/16 - Patient received cycle 12 of FOLFOX 6.   • 3/17/16 - Patient was admitted to the hospital with syncopal episode.   Patient was found to be hypotensive in the emergency room.  He was resuscitated with IV fluids.  He also had evidence of hepatic dysfunction with total bilirubin elevated to 5 and elevated liver function tests.    • 3/17/16 - During the hospitalization he had a CT scan of the abdomen and pelvis.  This revealed new 3.3 x 1.2 cm nonspecific fluid collection in the perirectal tissue.  There was also a 1.2 cm hypodense lesion in the right hepatic lobe.  MRI was recommended.  There was nonspecific thickening of the gallbladder wall and there was new mild hydronephrosis bilaterally.  Subsequently patient had an ultrasound of the abdomen which showed small amount of biliary sludge in the gallbladder, but no gallstones.  No definite findings to suggest cholecystitis.  CT scan of the head was negative for acute intracranial process.    • 3/18/16 - MRI of the abdomen with contrast revealed the area in the liver was atypical for colorectal metastases and this was thought to be most likely due to AV malformation.  Follow up MRI in three to six months was recommended.  The right hydronephrosis had resolved, but there was mild persistent hydronephrosis on the left.  • Patient was seen by Dr. Fairchild who was not too concerned about the perirectal fluid collection, but recommended antibiotics and rescanning in a few weeks to reevaluate.  Patient was also seen by Dr. Feldman with Urology service.  His bilateral hydronephrosis improved.  Throughout the hospitalization, patient also developed pancytopenia with leukopenia despite Neulasta.  During the course of his hospitalization he was noted to be hypotensive.  Patient was placed on Midodrine.    • 4/15/16 - CT scan of the abdomen and pelvis.  This basically showed persistent presacral soft tissue thickening with central fluid component.  Mild nonspecific thickening of the wall of the colon.    • 5/18/16 - PET/CT scan was essentially unremarkable.  No evidence of recurrent disease or  increased activity in the abdomen or pelvis to suggest metastatic disease.    • 6/2/16 - Colonoscopy and reversal of ileostomy.  The colonoscopy was essentially unremarkable.    • 6/17/16 - CT scan of the abdomen which showed evidence of post-op changes.  There was a 1.2 cm enhancing lesion in the posterior right hepatic lobe, stable in size.  Potential ileus.    • 8/17/16 - Patient had MRI of the liver which did not reveal any lesions in the liver.  No abnormal enhancing liver lesions to indicate metastatic disease were seen.  There was an abundant amount of stool throughout markedly dilated colon.  Patient has severe ileus with fecal impaction.  Patient has since then been seen by Dr. Fairchild.  He has another appointment scheduled for next week.    • 12/6/16 - CEA 1.8.  Chemistry panel:  BUN 4, creatinine 0.9.  • Patient was admitted to the hospital in January 2017.  At the time he underwent a diverting colostomy due to chronic rectal stricture resulting in colonic distention and small bowel distention.    • 1/19/17 - CT scan of the abdomen and pelvis which showed marked gaseous distention of the majority of the small bowel and entire colon.    • 3/21/17 - Patient had a colonoscopy per rectum and also stoma with dilatation of rectal stricture.  There were no masses identified.  There was mild narrowing, but no critical stricture of the rectum.  There was evidence of poor sphincter tone and rigidity of the tissues of the lower rectum.  Anal manometry was recommended by Dr. Fairchild to further evaluate the anal rectal function and decision for either reversal of colostomy or revision of his colostomy due to retraction will be made at that time.  • 4/13/17 - CT scan of the chest which showed stable minimal biapical pleural parenchymal scarring.  Otherwise the lungs were clear.  There were no pathologically enlarged intrathoracic lymph nodes identified.  Hepatic steatosis.    • 6/20/17 - Patient had an CHICHI which was  negative.  Haptoglobin was normal at 97.  SPEP immunofixation did not reveal any monoclonal protein.  Retic count normal at 1.06.  Folate normal at 16.  .  BUN 6, creatinine 0.9.  Methylmalonic acid was normal at 139.  PT 4.8, INR 1, PTT 29.2, fibrinogen normal at 324.    • Patient had CT scan of the chest, abdomen and pelvis on 1/8/18.  There was no evidence of recurrent disease in the chest, abdomen or pelvis.   • 5/18/18 - Bone marrow aspiration and biopsy showed normocellular bone marrow with adequate trilineage hematopoiesis.  There was no evidence of myeloproliferative disease, myelodysplastic disease.   Flow cytometry was negative.  Cytogenetics was normal male karyotype.  Megakaryocytes are adequate in number without any cytologic atypia.      • 10/2/18 - Patient was seen by Dr. Britton who ordered a 2D echocardiogram on 10/8/18.  EF was noted to be 25%.    • 1/24/19 - CT scan of the chest, abdomen and pelvis which did not show any evidence of metastatic disease in the abdomen.  But there was a 1 cm contrast enhancing mass in the right lobe of the liver, which could represent a hemangioma, but metastatic disease cannot be completely excluded.  Dedicated MRI of the liver has been recommended to further evaluate.    • 3/1/19 - MRI of the abdomen:  Spoke with Dr. Nicolette Agosto, the radiologist.  There was a 7 mm of focus of enhancement in the dome of the liver, which is new compared to prior imaging.  Does not have the typical MR appearance of metastatic lesion, but due to the fact that it is new, is worrisome.  Also according to Dr. Agosto, its location would be very difficult for image guided biopsy and also too small to be characterized better on a PET scan.  Therefore, follow up MRI of the liver was recommended in about three to four months.  The lesion is very close to the diaphragm which makes it more difficult to approach.  Patient will be notified and a follow up MRI will be scheduled.   • 5/8/19 -  Colonoscopy revealed no polyps or recurrent neoplasms seen.    • 5/30/19 - MRI of the abdomen which showed an enhancing lesion measuring 9 mm on the right hepatic lobe, unchanged from January 2019 CT.  Continued surveillance was recommended.  Of note this lesion is new from MRI of the abdomen from 2016.  Imaging characteristics are nonspecific.  • 6/3/19- CEA  1.3, bun 5, creat 1.1  • 11/7/2019-patient had MRI of the abdomen this showed a stable 9 mm lesion in the hepatic lobe.  It does not have typical enhancement characteristics of malignancy or metastatic disease.  Benign etiology such as hemangioma is favored follow-up in 6 to 12 months was recommended.  • 2/14/2020 patient had CT scan of the chest, abdomen and pelvis, there was no clear evidence of metastatic disease.  Stable 12 mm right liver lesion felt to represent a benign liver lesion.  There is thickening of the bladder wall and postop changes.  • July 2022 CEA was 1.47, CMP was unremarkable  • 2/24/2022 patient had CT scan of the chest, abdomen and pelvis.  Which did not show any evidence of thoracic or abdominal pelvic metastatic disease.  Nonspecific enteritis of the jejunum.    Past Medical History:   Diagnosis Date   • Bradycardia     Hx of bradycardia-Abstracted from [facility]    • Pacemaker 05/2015   • Rectal cancer (HCC)        Past Surgical History:   Procedure Laterality Date   • COLECTOMY PARTIAL / TOTAL  06/22/2017    partial colectomy with takedown of colostomy/new colostomy-Dr. Fairchild-[facility]ed from [facility]   • COLOSTOMY REVISION  06/22/2017    Dr. Fairchild-Abstracted from [facility]    • ILEOSTOMY  06/02/2016    ileostomy reversal-Dr. Fairchild-Abstracted from [facility]    • LOW ANTERIOR BOWEL RESECTION  08/07/2015    Ileostomy-Abstracted from [facility]    • SIGMOIDOSCOPY  01/21/2017    with colostomy-Dr. Fairchild-Abstracted from [facility]    • VENOUS ACCESS DEVICE (PORT) INSERTION Right 05/2015    Subclavian infuse a port placement-Abstracted from [facility]    • VENOUS ACCESS  DEVICE (PORT) REMOVAL  06/02/2016         Current Outpatient Medications:   •  aspirin (aspirin) 81 MG EC tablet, Daily., Disp: , Rfl:   •  lisinopril (PRINIVIL,ZESTRIL) 20 MG tablet, TAKE 1 TABLET BY MOUTH DAILY, Disp: 90 tablet, Rfl: 2  •  metoprolol succinate XL (TOPROL-XL) 25 MG 24 hr tablet, TAKE 1 TABLET BY MOUTH DAILY, Disp: 90 tablet, Rfl: 1    No Known Allergies    Family History   Problem Relation Age of Onset   • Stroke Mother    • Stroke Father    • Heart attack Sister    • Heart disease Other    • Hypertension Other        Cancer-related family history is not on file.    Social History     Tobacco Use   • Smoking status: Never Smoker   • Smokeless tobacco: Never Used   Vaping Use   • Vaping Use: Never used   Substance Use Topics   • Alcohol use: No   • Drug use: No       I have reviewed and confirmed the accuracy of the patient's history: Chief complaint, HPI and ROS as entered by the MA/LPN/RN. Veronica Villanueva MD 08/08/22       SUBJECTIVE:      Patient does not have any new issues.  Accompanied today by his niece for this appointment.        REVIEW OF SYSTEMS:    Review of Systems   Constitutional: Negative for chills and fever.   HENT: Negative for ear pain, mouth sores, nosebleeds and sore throat.    Eyes: Negative for photophobia and visual disturbance.   Respiratory: Negative for wheezing and stridor.    Cardiovascular: Negative for chest pain and palpitations.   Gastrointestinal: Negative for abdominal pain, diarrhea, nausea and vomiting.   Endocrine: Negative for cold intolerance and heat intolerance.   Genitourinary: Negative for dysuria and hematuria.   Musculoskeletal: Negative for joint swelling and neck stiffness.   Skin: Negative for color change and rash.   Neurological: Negative for seizures and syncope.   Hematological: Negative for adenopathy.        No obvious bleeding   Psychiatric/Behavioral: Negative for agitation, confusion and hallucinations.         OBJECTIVE:    Vitals:  "   08/08/22 0850   BP: 128/90   Pulse: 76   Temp: 96.9 °F (36.1 °C)   SpO2: 98%   Weight: 63.5 kg (140 lb)  Comment: verbal   Height: 170.2 cm (67\")   PainSc: 0-No pain       ECOG  (1) Restricted in physically strenuous activity, ambulatory and able to do work of light nature    Physical Exam   Constitutional: He is oriented to person, place, and time. No distress.   HENT:   Head: Normocephalic and atraumatic.   Eyes: Conjunctivae are normal. Right eye exhibits no discharge. Left eye exhibits no discharge. No scleral icterus.   Neck: No thyromegaly present.   Cardiovascular: Normal rate, regular rhythm and normal heart sounds. Exam reveals no gallop and no friction rub.   Pulmonary/Chest: Effort normal. No stridor. No respiratory distress. He has no wheezes.   Abdominal: Soft. Bowel sounds are normal. He exhibits no mass. There is no abdominal tenderness. There is no rebound and no guarding.   Colostomy in place   Musculoskeletal: Normal range of motion. No tenderness.   Lymphadenopathy:     He has no cervical adenopathy.   Neurological: He is alert and oriented to person, place, and time. He exhibits normal muscle tone.   Skin: Skin is warm. No rash noted. He is not diaphoretic. No erythema.   Psychiatric: His behavior is normal.   Nursing note and vitals reviewed.    Physical exam as documented above    I have reexamined the patient and the results are consistent with the previously documented exam. Veronica Villanueva MD     RECENT LABS    WBC   Date Value Ref Range Status   08/08/2022 5.55 3.40 - 10.80 10*3/mm3 Final     RBC   Date Value Ref Range Status   08/08/2022 4.94 4.14 - 5.80 10*6/mm3 Final     Hemoglobin   Date Value Ref Range Status   08/08/2022 15.2 13.0 - 17.7 g/dL Final     Hematocrit   Date Value Ref Range Status   08/08/2022 44.8 37.5 - 51.0 % Final     MCV   Date Value Ref Range Status   08/08/2022 90.7 79.0 - 97.0 fL Final     MCH   Date Value Ref Range Status   08/08/2022 30.8 26.6 - 33.0 pg " Final     MCHC   Date Value Ref Range Status   08/08/2022 33.9 31.5 - 35.7 g/dL Final     RDW   Date Value Ref Range Status   08/08/2022 14.4 12.3 - 15.4 % Final     RDW-SD   Date Value Ref Range Status   08/08/2022 46.6 37.0 - 54.0 fl Final     MPV   Date Value Ref Range Status   08/08/2022 11.8 6.0 - 12.0 fL Final     Platelets   Date Value Ref Range Status   08/08/2022 140 140 - 450 10*3/mm3 Final     Neutrophil %   Date Value Ref Range Status   08/08/2022 68.5 42.7 - 76.0 % Final     Lymphocyte %   Date Value Ref Range Status   08/08/2022 16.9 (L) 19.6 - 45.3 % Final     Monocyte %   Date Value Ref Range Status   08/08/2022 11.0 5.0 - 12.0 % Final     Eosinophil %   Date Value Ref Range Status   08/08/2022 3.1 0.3 - 6.2 % Final     Basophil %   Date Value Ref Range Status   08/08/2022 0.5 0.0 - 1.5 % Final     Neutrophils, Absolute   Date Value Ref Range Status   08/08/2022 3.80 1.70 - 7.00 10*3/mm3 Final     Lymphocytes, Absolute   Date Value Ref Range Status   08/08/2022 0.94 0.70 - 3.10 10*3/mm3 Final     Monocytes, Absolute   Date Value Ref Range Status   08/08/2022 0.61 0.10 - 0.90 10*3/mm3 Final     Eosinophils, Absolute   Date Value Ref Range Status   08/08/2022 0.17 0.00 - 0.40 10*3/mm3 Final     Basophils, Absolute   Date Value Ref Range Status   08/08/2022 0.03 0.00 - 0.20 10*3/mm3 Final     nRBC   Date Value Ref Range Status   06/30/2017 0 0 /100[WBCs] Final       Lab Results   Component Value Date    GLUCOSE 91 08/08/2022    BUN 10 08/08/2022    CREATININE 1.14 08/08/2022    EGFRIFNONA 64 02/09/2022    EGFRIFAFRI >60 05/30/2019    BCR 8.8 08/08/2022    K 4.5 08/08/2022    CO2 26.0 08/08/2022    CALCIUM 8.9 08/08/2022    ALBUMIN 4.20 08/08/2022    LABIL2 1.4 06/03/2019    AST 17 08/08/2022    ALT 12 08/08/2022           ASSESSMENT:    1. Stage III rectal adenocarcinoma, status post combined chemotherapy and radiation with infusional 5-FU.  Residual malignancy is [id number].  Status post low anterior  resection with low pelvic stapled anastomosis and diverting loop ileostomy.  Status post adjuvant chemotherapy with FOLFOX 6.  Continue surveillance  2. B12 deficiency, on oral B12 supplement.  We will check B12 level today  3. Mild chemo-induced neuropathy.:  Stable  4. Possible AV malformation in the liver.  MRI of the liver from August 2016 did not reveal any lesions.  Reviewed CT scans from February 2022  5. Thrombocytopenia.  Status post bone marrow aspiration and biopsy with negative findings..  CBC today is normal platelets remained stable  6. Status post ileostomy reversal  7. Rectal stricture.  Stable  8. Diverting colostomy due to chronic colonic obstruction from rectal stricture.    9. Cardiomyopathy with EF of 30 to 35% on ECHO done 8/7/2020.  Patient encouraged to follow-up with his cardiologist  10. Liver lesion likely benign: Reevaluate with CTs    PLANS:     CBC reviewed.  CMP and CEA today    Referred to GI for colonoscopy    Patient will also follow up with the rest of his physicians for his ongoing medical problems.     Continue B12 injection today.      Follow-up in 6 months with nurse practitioner      Patient to follow-up with Dr. Britton  his cardiologist for cardiomyopathy  Follow-up with PCP           I have reviewed labs results, imaging, vitals, and medications with the patient today. Will follow up in 6 months       Patient verbalized understanding and is in agreement of the above plan.    I spent 30 total minutes, face-to-face, caring for Dave today.  90% of this time involved counseling and/or coordination of care as documented within this note.

## 2022-08-08 ENCOUNTER — OFFICE VISIT (OUTPATIENT)
Dept: ONCOLOGY | Facility: CLINIC | Age: 62
End: 2022-08-08

## 2022-08-08 ENCOUNTER — LAB (OUTPATIENT)
Dept: LAB | Facility: HOSPITAL | Age: 62
End: 2022-08-08

## 2022-08-08 VITALS
SYSTOLIC BLOOD PRESSURE: 128 MMHG | TEMPERATURE: 96.9 F | DIASTOLIC BLOOD PRESSURE: 90 MMHG | HEIGHT: 67 IN | BODY MASS INDEX: 21.97 KG/M2 | WEIGHT: 140 LBS | OXYGEN SATURATION: 98 % | HEART RATE: 76 BPM

## 2022-08-08 DIAGNOSIS — C18.9 MALIGNANT NEOPLASM OF COLON, UNSPECIFIED PART OF COLON: Primary | ICD-10-CM

## 2022-08-08 LAB
ALBUMIN SERPL-MCNC: 4.2 G/DL (ref 3.5–5.2)
ALBUMIN/GLOB SERPL: 1.6 G/DL
ALP SERPL-CCNC: 98 U/L (ref 39–117)
ALT SERPL W P-5'-P-CCNC: 12 U/L (ref 1–41)
ANION GAP SERPL CALCULATED.3IONS-SCNC: 10 MMOL/L (ref 5–15)
AST SERPL-CCNC: 17 U/L (ref 1–40)
BASOPHILS # BLD AUTO: 0.03 10*3/MM3 (ref 0–0.2)
BASOPHILS NFR BLD AUTO: 0.5 % (ref 0–1.5)
BILIRUB SERPL-MCNC: 0.7 MG/DL (ref 0–1.2)
BUN SERPL-MCNC: 10 MG/DL (ref 8–23)
BUN/CREAT SERPL: 8.8 (ref 7–25)
CALCIUM SPEC-SCNC: 8.9 MG/DL (ref 8.6–10.5)
CEA SERPL-MCNC: 1.73 NG/ML
CHLORIDE SERPL-SCNC: 107 MMOL/L (ref 98–107)
CO2 SERPL-SCNC: 26 MMOL/L (ref 22–29)
CREAT SERPL-MCNC: 1.14 MG/DL (ref 0.76–1.27)
DEPRECATED RDW RBC AUTO: 46.6 FL (ref 37–54)
EGFRCR SERPLBLD CKD-EPI 2021: 71.8 ML/MIN/1.73
EOSINOPHIL # BLD AUTO: 0.17 10*3/MM3 (ref 0–0.4)
EOSINOPHIL NFR BLD AUTO: 3.1 % (ref 0.3–6.2)
ERYTHROCYTE [DISTWIDTH] IN BLOOD BY AUTOMATED COUNT: 14.4 % (ref 12.3–15.4)
GLOBULIN UR ELPH-MCNC: 2.6 GM/DL
GLUCOSE SERPL-MCNC: 91 MG/DL (ref 65–99)
HCT VFR BLD AUTO: 44.8 % (ref 37.5–51)
HGB BLD-MCNC: 15.2 G/DL (ref 13–17.7)
HOLD SPECIMEN: NORMAL
HOLD SPECIMEN: NORMAL
LYMPHOCYTES # BLD AUTO: 0.94 10*3/MM3 (ref 0.7–3.1)
LYMPHOCYTES NFR BLD AUTO: 16.9 % (ref 19.6–45.3)
MCH RBC QN AUTO: 30.8 PG (ref 26.6–33)
MCHC RBC AUTO-ENTMCNC: 33.9 G/DL (ref 31.5–35.7)
MCV RBC AUTO: 90.7 FL (ref 79–97)
MONOCYTES # BLD AUTO: 0.61 10*3/MM3 (ref 0.1–0.9)
MONOCYTES NFR BLD AUTO: 11 % (ref 5–12)
NEUTROPHILS NFR BLD AUTO: 3.8 10*3/MM3 (ref 1.7–7)
NEUTROPHILS NFR BLD AUTO: 68.5 % (ref 42.7–76)
PLATELET # BLD AUTO: 140 10*3/MM3 (ref 140–450)
PMV BLD AUTO: 11.8 FL (ref 6–12)
POTASSIUM SERPL-SCNC: 4.5 MMOL/L (ref 3.5–5.2)
PROT SERPL-MCNC: 6.8 G/DL (ref 6–8.5)
RBC # BLD AUTO: 4.94 10*6/MM3 (ref 4.14–5.8)
SODIUM SERPL-SCNC: 143 MMOL/L (ref 136–145)
WBC NRBC COR # BLD: 5.55 10*3/MM3 (ref 3.4–10.8)

## 2022-08-08 PROCEDURE — 36415 COLL VENOUS BLD VENIPUNCTURE: CPT

## 2022-08-08 PROCEDURE — 82378 CARCINOEMBRYONIC ANTIGEN: CPT | Performed by: INTERNAL MEDICINE

## 2022-08-08 PROCEDURE — 85025 COMPLETE CBC W/AUTO DIFF WBC: CPT

## 2022-08-08 PROCEDURE — 99214 OFFICE O/P EST MOD 30 MIN: CPT | Performed by: INTERNAL MEDICINE

## 2022-08-08 PROCEDURE — 80053 COMPREHEN METABOLIC PANEL: CPT | Performed by: INTERNAL MEDICINE

## 2022-09-15 RX ORDER — METOPROLOL SUCCINATE 25 MG/1
TABLET, EXTENDED RELEASE ORAL
Qty: 90 TABLET | Refills: 1 | Status: SHIPPED | OUTPATIENT
Start: 2022-09-15 | End: 2023-03-14

## 2022-09-15 NOTE — TELEPHONE ENCOUNTER
Rx Refill Note  Requested Prescriptions     Signed Prescriptions Disp Refills   • metoprolol succinate XL (TOPROL-XL) 25 MG 24 hr tablet 90 tablet 1     Sig: TAKE 1 TABLET BY MOUTH DAILY     Authorizing Provider: ARYA PONCE     Ordering User: TIESHA GAMBOA      Last office visit with prescribing clinician: 11/22/2021      Next office visit with prescribing clinician: 11/29/2022            Tiesha Gamboa MA  09/15/22, 08:42 EDT

## 2022-09-19 RX ORDER — LISINOPRIL 20 MG/1
20 TABLET ORAL DAILY
Qty: 90 TABLET | Refills: 0 | Status: SHIPPED | OUTPATIENT
Start: 2022-09-19 | End: 2022-12-14

## 2022-09-19 NOTE — TELEPHONE ENCOUNTER
Rx Refill Note  Requested Prescriptions     Pending Prescriptions Disp Refills   • lisinopril (PRINIVIL,ZESTRIL) 20 MG tablet [Pharmacy Med Name: LISINOPRIL 20MG TABLETS] 90 tablet 2     Sig: TAKE 1 TABLET BY MOUTH DAILY      Last office visit with prescribing clinician: 11/22/2021      Next office visit with prescribing clinician: 11/29/2022            Rox Montague MA  09/19/22, 10:32 EDT

## 2022-10-18 ENCOUNTER — ANESTHESIA (OUTPATIENT)
Dept: GASTROENTEROLOGY | Facility: HOSPITAL | Age: 62
End: 2022-10-18

## 2022-10-18 ENCOUNTER — HOSPITAL ENCOUNTER (OUTPATIENT)
Facility: HOSPITAL | Age: 62
Setting detail: HOSPITAL OUTPATIENT SURGERY
Discharge: HOME | End: 2022-10-18
Attending: INTERNAL MEDICINE | Admitting: INTERNAL MEDICINE

## 2022-10-18 ENCOUNTER — ON CAMPUS - OUTPATIENT (AMBULATORY)
Dept: URBAN - METROPOLITAN AREA HOSPITAL 85 | Facility: HOSPITAL | Age: 62
End: 2022-10-18

## 2022-10-18 ENCOUNTER — ANESTHESIA EVENT (OUTPATIENT)
Dept: GASTROENTEROLOGY | Facility: HOSPITAL | Age: 62
End: 2022-10-18

## 2022-10-18 VITALS
RESPIRATION RATE: 16 BRPM | WEIGHT: 143.08 LBS | BODY MASS INDEX: 22.46 KG/M2 | DIASTOLIC BLOOD PRESSURE: 65 MMHG | HEIGHT: 67 IN | SYSTOLIC BLOOD PRESSURE: 98 MMHG | HEART RATE: 75 BPM | OXYGEN SATURATION: 100 % | TEMPERATURE: 97.8 F

## 2022-10-18 DIAGNOSIS — K63.5 POLYP OF COLON: ICD-10-CM

## 2022-10-18 DIAGNOSIS — Z85.048 PERSONAL HISTORY OF OTHER MALIGNANT NEOPLASM OF RECTUM, RECT: ICD-10-CM

## 2022-10-18 DIAGNOSIS — Z85.048 PERSONAL HISTORY OF RECTAL CANCER: ICD-10-CM

## 2022-10-18 DIAGNOSIS — Z12.11 ENCOUNTER FOR SCREENING FOR MALIGNANT NEOPLASM OF COLON: ICD-10-CM

## 2022-10-18 PROCEDURE — 45380 COLONOSCOPY AND BIOPSY: CPT | Mod: PT | Performed by: INTERNAL MEDICINE

## 2022-10-18 PROCEDURE — 25010000002 PROPOFOL 200 MG/20ML EMULSION: Performed by: ANESTHESIOLOGY

## 2022-10-18 PROCEDURE — 88305 TISSUE EXAM BY PATHOLOGIST: CPT | Performed by: INTERNAL MEDICINE

## 2022-10-18 RX ORDER — ONDANSETRON 2 MG/ML
4 INJECTION INTRAMUSCULAR; INTRAVENOUS ONCE AS NEEDED
Status: DISCONTINUED | OUTPATIENT
Start: 2022-10-18 | End: 2022-10-18 | Stop reason: HOSPADM

## 2022-10-18 RX ORDER — SODIUM CHLORIDE 9 MG/ML
9 INJECTION, SOLUTION INTRAVENOUS ONCE
Status: COMPLETED | OUTPATIENT
Start: 2022-10-18 | End: 2022-10-18

## 2022-10-18 RX ORDER — FLUMAZENIL 0.1 MG/ML
0.5 INJECTION INTRAVENOUS AS NEEDED
Status: DISCONTINUED | OUTPATIENT
Start: 2022-10-18 | End: 2022-10-18 | Stop reason: HOSPADM

## 2022-10-18 RX ORDER — PROMETHAZINE HYDROCHLORIDE 25 MG/1
25 SUPPOSITORY RECTAL ONCE AS NEEDED
Status: DISCONTINUED | OUTPATIENT
Start: 2022-10-18 | End: 2022-10-18 | Stop reason: HOSPADM

## 2022-10-18 RX ORDER — LORAZEPAM 2 MG/ML
0.5 INJECTION INTRAMUSCULAR
Status: DISCONTINUED | OUTPATIENT
Start: 2022-10-18 | End: 2022-10-18 | Stop reason: HOSPADM

## 2022-10-18 RX ORDER — SODIUM CHLORIDE 0.9 % (FLUSH) 0.9 %
3-10 SYRINGE (ML) INJECTION AS NEEDED
Status: DISCONTINUED | OUTPATIENT
Start: 2022-10-18 | End: 2022-10-18 | Stop reason: HOSPADM

## 2022-10-18 RX ORDER — MEPERIDINE HYDROCHLORIDE 25 MG/ML
12.5 INJECTION INTRAMUSCULAR; INTRAVENOUS; SUBCUTANEOUS
Status: DISCONTINUED | OUTPATIENT
Start: 2022-10-18 | End: 2022-10-18 | Stop reason: HOSPADM

## 2022-10-18 RX ORDER — SODIUM CHLORIDE 0.9 % (FLUSH) 0.9 %
3 SYRINGE (ML) INJECTION EVERY 12 HOURS SCHEDULED
Status: DISCONTINUED | OUTPATIENT
Start: 2022-10-18 | End: 2022-10-18 | Stop reason: HOSPADM

## 2022-10-18 RX ORDER — HYDROCODONE BITARTRATE AND ACETAMINOPHEN 5; 325 MG/1; MG/1
1 TABLET ORAL ONCE AS NEEDED
Status: DISCONTINUED | OUTPATIENT
Start: 2022-10-18 | End: 2022-10-18 | Stop reason: HOSPADM

## 2022-10-18 RX ORDER — KETOROLAC TROMETHAMINE 30 MG/ML
15 INJECTION, SOLUTION INTRAMUSCULAR; INTRAVENOUS EVERY 6 HOURS PRN
Status: DISCONTINUED | OUTPATIENT
Start: 2022-10-18 | End: 2022-10-18 | Stop reason: HOSPADM

## 2022-10-18 RX ORDER — NALOXONE HCL 0.4 MG/ML
0.4 VIAL (ML) INJECTION AS NEEDED
Status: DISCONTINUED | OUTPATIENT
Start: 2022-10-18 | End: 2022-10-18 | Stop reason: HOSPADM

## 2022-10-18 RX ORDER — PROMETHAZINE HYDROCHLORIDE 25 MG/1
25 TABLET ORAL ONCE AS NEEDED
Status: DISCONTINUED | OUTPATIENT
Start: 2022-10-18 | End: 2022-10-18 | Stop reason: HOSPADM

## 2022-10-18 RX ORDER — ACETAMINOPHEN 650 MG/1
650 SUPPOSITORY RECTAL ONCE AS NEEDED
Status: DISCONTINUED | OUTPATIENT
Start: 2022-10-18 | End: 2022-10-18 | Stop reason: HOSPADM

## 2022-10-18 RX ORDER — ACETAMINOPHEN 325 MG/1
650 TABLET ORAL ONCE AS NEEDED
Status: DISCONTINUED | OUTPATIENT
Start: 2022-10-18 | End: 2022-10-18 | Stop reason: HOSPADM

## 2022-10-18 RX ORDER — PROPOFOL 10 MG/ML
INJECTION, EMULSION INTRAVENOUS AS NEEDED
Status: DISCONTINUED | OUTPATIENT
Start: 2022-10-18 | End: 2022-10-18 | Stop reason: SURG

## 2022-10-18 RX ADMIN — PROPOFOL 40 MG: 10 INJECTION, EMULSION INTRAVENOUS at 10:43

## 2022-10-18 RX ADMIN — PROPOFOL 100 MG: 10 INJECTION, EMULSION INTRAVENOUS at 10:35

## 2022-10-18 RX ADMIN — SODIUM CHLORIDE 9 ML/HR: 0.9 INJECTION, SOLUTION INTRAVENOUS at 09:53

## 2022-10-18 RX ADMIN — PROPOFOL 40 MG: 10 INJECTION, EMULSION INTRAVENOUS at 10:39

## 2022-10-18 NOTE — DISCHARGE INSTRUCTIONS
A responsible adult should stay with you and you should rest quietly for the rest of the day.    Do not drink alcohol, drive, operate any heavy machinery or power tools or make any legal/important decisions for the next 24 hours.     Progress your diet as tolerated.  If you begin to experience severe pain, increased shortness of breath, racing heartbeat or a fever above 101 F, seek immediate medical attention.     Follow up with MD as instructed. Call office for results in 3 to 5 days if needed.    [phone]    Findings:  8 mm congested fold in the descending colon was biopsied to rule out sessile polyp  Evidence of prior rectal and sigmoid resection with ostomy  Previous attempt at flex sig showed only 2 cm remaining rectal pouch so this was not examined today     Impression:  Personal history of rectal cancer with 1 possible polyp versus congested fold biopsied today     Recommendations:  Follow-up biopsy results  If polyp is adenomatous repeat colonoscopy in 1 year otherwise repeat colonoscopy in 5 years

## 2022-10-18 NOTE — H&P
GI CONSULT  NOTE:    Referring Provider:    Renato Foreman MD  [unfilled]    Chief complaint: <principal problem not specified>  Personal history of colon cancer  Subjective .     History of present illness:      Dave Aguilar is a 62 y.o. male who presents today for Procedure(s):  COLONOSCOPY for the indications listed below.     The updated Patient Profile was reviewed prior to the procedure, in conjunction with the Physical Exam, including medical conditions, surgical procedures, medications, allergies, family history and social history.     Pre-operatively, I reviewed the indication(s) for the procedure, the risks of the procedure [including but not limited to: unexpected bleeding possibly requiring hospitalization and/or unplanned repeat procedures, perforation possibly requiring surgical treatment, missed lesions and complications of sedation/MAC (also explained by anesthesia staff)].     I have evaluated the patient for risks associated with the planned anesthesia and the procedure to be performed and find the patient an acceptable candidate for IV sedation.    Multiple opportunities were provided for any questions or concerns, and all questions were answered satisfactorily before any anesthesia was administered. We will proceed with the planned procedure.    Past Medical History:  Past Medical History:   Diagnosis Date   • Bradycardia     Hx of brady[facility]    • Hypertension    • Pacemaker 05/2015   • Rectal cancer (HCC)        Past Surgical History:  Past Surgical History:   Procedure Laterality Date   • COLECTOMY PARTIAL / TOTAL  06/22/2017    partial colectomy with takedown of colostomy/new colostomy-Dr. Fairchild-Abstracted from [facility].   • COLOSTOMY REVISION  06/22/2017    Dr. Fairchild-[facility] from [facility]    • ILEOSTOMY  06/02/2016    ileostomy reversal-Dr. Fairchild-[facility]ed from [facility]    • LOW ANTERIOR BOWEL RESECTION  08/07/2015    Ileostomy-[facility]ed from [facility]    • PACEMAKER  "IMPLANTATION     • SIGMOIDOSCOPY  01/21/2017    with colostomy-Dr. Fairchild-Abstracted from [facility].    • VENOUS ACCESS DEVICE (PORT) INSERTION Right 05/2015    Subclavian infuse a port placement-[facility]ed from [facility]    • VENOUS ACCESS DEVICE (PORT) REMOVAL  06/02/2016       Social History:  Social History     Tobacco Use   • Smoking status: Never   • Smokeless tobacco: Never   Vaping Use   • Vaping Use: Never used   Substance Use Topics   • Alcohol use: No   • Drug use: No       Family History:  Family History   Problem Relation Age of Onset   • Stroke Mother    • Stroke Father    • Heart attack Sister    • Heart disease Other    • Hypertension Other        Medications:  No medications prior to admission.       Scheduled Meds:  Continuous Infusions:No current facility-administered medications for this encounter.    PRN Meds:.    ALLERGIES:  Patient has no known allergies.    ROS:  The following systems were reviewed and negative;   Constitution:  No fevers, chills, no unintentional weight loss  Skin: no rash, no jaundice  Eyes:  No blurry vision, no eye pain  HENT:  No change in hearing or smell  Resp:  No dyspnea or cough  CV:  No chest pain or palpitations  :  No dysuria, hematuria  Musculoskeletal:  No leg cramps or arthralgias  Neuro:  No tremor, no numbness  Psych:  No depression or confsuion    Objective     Vital Signs:   Vitals:    10/06/22 0924   Weight: 54.4 kg (120 lb)   Height: 170.2 cm (67\")       Physical Exam:       General Appearance:    Awake and alert, in no acute distress   Head:    Normocephalic, without obvious abnormality, atraumatic   Throat:   No oral lesions, no thrush, oral mucosa moist   Lungs:     respirations regular, even and unlabored   Skin:   No rash, no jaundice       Results Review:  Lab Results (last 24 hours)     ** No results found for the last 24 hours. **          Imaging Results (Last 24 Hours)     ** No results found for the last 24 hours. **           I reviewed the patient's " labs and imaging.    ASSESSMENT AND PLAN:  Personal history of colon cancer    Active Problems:    * No active hospital problems. *       Procedure(s):  COLONOSCOPY      I discussed the patients findings and my recommendations with the patient.    Janes Treadwell MD  10/18/22  08:19 EDT

## 2022-10-18 NOTE — ANESTHESIA POSTPROCEDURE EVALUATION
Patient: Dave Aguilar    Procedure Summary     Date: 10/18/22 Room / Location: [facility] ENDOSCOPY 1 / [facility] ENDOSCOPY    Anesthesia Start: 1030 Anesthesia Stop: 1050    Procedure: COLONOSCOPY with biopsy x1 area. Diagnosis:       Personal history of rectal cancer      (Personal history of rectal cancer [Z85.048])    Surgeons: Janes Treadwell MD Provider: Favian Mayer MD    Anesthesia Type: MAC ASA Status: 2          Anesthesia Type: MAC    Vitals  Vitals Value Taken Time   BP 75/52 10/18/22 1056   Temp     Pulse 69 10/18/22 1058   Resp     SpO2 97 % 10/18/22 1058   Vitals shown include unvalidated device data.        Post Anesthesia Care and Evaluation    Patient location during evaluation: bedside  Patient participation: complete - patient participated  Level of consciousness: awake and alert  Pain score: 1  Pain management: adequate    Airway patency: patent  Anesthetic complications: No anesthetic complications  PONV Status: none  Cardiovascular status: acceptable  Respiratory status: acceptable  Hydration status: acceptable  Post Neuraxial Block status: Motor and sensory function returned to baseline

## 2022-10-18 NOTE — ANESTHESIA PREPROCEDURE EVALUATION
Anesthesia Evaluation     Patient summary reviewed and Nursing notes reviewed   NPO Solid Status: > 6 hours  NPO Liquid Status: > 6 hours           Airway   Mallampati: II  TM distance: >3 FB  Neck ROM: full  No difficulty expected  Dental - normal exam     Pulmonary - normal exam    breath sounds clear to auscultation  (+) shortness of breath,   Cardiovascular - normal exam    ECG reviewed  Rhythm: regular  Rate: normal    (+) hypertension,       Neuro/Psych  (+) syncope,    GI/Hepatic/Renal/Endo    (+)  GERD,      Musculoskeletal (-) negative ROS    Abdominal  - normal exam    Abdomen: soft.  Bowel sounds: normal.   Substance History - negative use     OB/GYN negative ob/gyn ROS         Other      history of cancer                    Anesthesia Plan    ASA 2     MAC     intravenous induction     Anesthetic plan, risks, benefits, and alternatives have been provided, discussed and informed consent has been obtained with: patient.        CODE STATUS:

## 2022-10-19 LAB
LAB AP CASE REPORT: NORMAL
LAB AP CLINICAL INFORMATION: NORMAL
LAB AP DIAGNOSIS COMMENT: NORMAL
PATH REPORT.FINAL DX SPEC: NORMAL
PATH REPORT.GROSS SPEC: NORMAL

## 2022-12-14 RX ORDER — LISINOPRIL 20 MG/1
20 TABLET ORAL DAILY
Qty: 90 TABLET | Refills: 0 | Status: SHIPPED | OUTPATIENT
Start: 2022-12-14 | End: 2023-03-14

## 2022-12-14 NOTE — TELEPHONE ENCOUNTER
MADE PT AWARE OF RESULTS.     VERBAL UNDERSTANDING EXPRESSED    Rx Refill Note  Requested Prescriptions     Pending Prescriptions Disp Refills   • lisinopril (PRINIVIL,ZESTRIL) 20 MG tablet [Pharmacy Med Name: LISINOPRIL 20MG TABLETS] 90 tablet 0     Sig: TAKE 1 TABLET BY MOUTH DAILY      Last office visit with prescribing clinician: 11/22/2021     Next office visit with prescribing clinician: 1/11/2023                Vi Abdi MA  12/14/22, 08:32 EST

## 2023-01-11 ENCOUNTER — CLINICAL SUPPORT NO REQUIREMENTS (OUTPATIENT)
Dept: CARDIOLOGY | Facility: CLINIC | Age: 63
End: 2023-01-11
Payer: MEDICARE

## 2023-01-11 ENCOUNTER — OFFICE VISIT (OUTPATIENT)
Dept: CARDIOLOGY | Facility: CLINIC | Age: 63
End: 2023-01-11
Payer: MEDICARE

## 2023-01-11 VITALS
HEIGHT: 67 IN | DIASTOLIC BLOOD PRESSURE: 101 MMHG | BODY MASS INDEX: 22.6 KG/M2 | SYSTOLIC BLOOD PRESSURE: 125 MMHG | WEIGHT: 144 LBS | HEART RATE: 65 BPM | OXYGEN SATURATION: 96 %

## 2023-01-11 DIAGNOSIS — I10 ESSENTIAL HYPERTENSION: Primary | ICD-10-CM

## 2023-01-11 DIAGNOSIS — I42.0 DILATED CARDIOMYOPATHY: ICD-10-CM

## 2023-01-11 DIAGNOSIS — Z95.0 PACEMAKER: ICD-10-CM

## 2023-01-11 DIAGNOSIS — Z95.0 PACEMAKER: Primary | ICD-10-CM

## 2023-01-11 DIAGNOSIS — R00.1 BRADYCARDIA: ICD-10-CM

## 2023-01-11 PROCEDURE — 93000 ELECTROCARDIOGRAM COMPLETE: CPT | Performed by: INTERNAL MEDICINE

## 2023-01-11 PROCEDURE — 99214 OFFICE O/P EST MOD 30 MIN: CPT | Performed by: INTERNAL MEDICINE

## 2023-01-11 PROCEDURE — 93280 PM DEVICE PROGR EVAL DUAL: CPT | Performed by: INTERNAL MEDICINE

## 2023-01-11 RX ORDER — AMLODIPINE BESYLATE 10 MG/1
10 TABLET ORAL DAILY
Qty: 90 TABLET | Refills: 3 | Status: SHIPPED | OUTPATIENT
Start: 2023-01-11

## 2023-01-11 NOTE — PROGRESS NOTES
Subjective:     Encounter Date:01/11/2023      Patient ID: Dave Aguilar is a 62 y.o. male.    Chief Complaint : Follow-up for pacemaker, hypertension, cardiomyopathy    History of Present Illness        Mr. Dave Aguilar has PMH of    # recurrent syncope  # severe bradycardia, status post MRI safe Medtronic permanent pacemaker 05/13/2015  # blood loss anemia, colorectal adeno CA on chemo and XRT  # mildly elevated glucose  # Rectal cancer status post chemo August 2015     here for  follow-up.  Patient denies any chest pain or shortness of breath.    Patient's arterial blood pressure is 1 153/100, heart rate 65 bpm, O2 sat of 96% on room air    Patient had an echocardiogram 8/7/2020 done showing cardiomyopathy LVEF of 30 to 35%.  Patient's labs from 3/25/2020 reveal UA without proteinuria.  CMP, CBC from 2/20/2020 normal, labs from 8/10/2020 revealed CMP normal except for glucose of 107.  Labs from 8/9/2021 reveal normal CBC and CMP       ASSESSMENT;    # CMP  #  hypertension  #. Jay, syncope, S/P ppm  #. elevated blood glucose     PLAN;    Patient's blood pressure is not under control will add amlodipine.  Continue medical management with aspirin, lisinopril and metoprolol and amlodipine  Advised patient to check blood pressure at home.  Call if it is elevated.  We will follow-up in nurse practitioner Yuliya Oliveros in 1 month and see if blood pressures controlled or is consider increasing medical management.  We will follow-up in CHF clinic in 1 months and follow-up with me in 1 year.  Follow-up in pacemaker clinic.  Patient had pacemaker checked which is functioning well.  Reviewed hyperglycemia with patient, advised to follow-up with PMD for hyperglycemia  Reviewed EKG with patient and family   Counseled on walking exercise.           ECG 12 Lead    Date/Time: 1/11/2023 11:16 AM  Performed by: Gino Britton MD  Authorized by: Gino Britton MD   Comparison: compared with previous  ECG from 11/22/2021  Comparison to previous ECG: EKG done today reviewed/interpreted by me reveals 100% a paced rhythm with rate of 62 bpm with LVH, no new change compared EKG from 11/22/2021              Copied text in this portion of the note has been reviewed and is accurate as of 1/11/2023  The following portions of the patient's history were reviewed and updated as appropriate: allergies, current medications, past family history, past medical history, past social history, past surgical history and problem list.    Assessment:         MDM     Diagnosis Plan   1. Essential hypertension  Comprehensive Metabolic Panel    Lipid Panel      2. Pacemaker  Comprehensive Metabolic Panel    Lipid Panel      3. Dilated cardiomyopathy (HCC)  Comprehensive Metabolic Panel    Lipid Panel             Plan:               Past Medical History:  Past Medical History:   Diagnosis Date   • Bradycardia     Hx of br[facility]    • Hypertension    • Pacemaker 05/2015   • Rectal cancer (HCC)      Past Surgical History:  Past Surgical History:   Procedure Laterality Date   • COLECTOMY PARTIAL / TOTAL  06/22/2017    partial colectomy with takedown of colostomy/new colostomy-Dr. Fairchild-[facility]   • COLONOSCOPY N/A 10/18/2022    Procedure: COLONOSCOPY with biopsy x1 area.;  Surgeon: Janes Treadwell MD;  Location: [facility] ENDOSCOPY;  Service: Gastroenterology;  Laterality: N/A;  Post- congested fold vs polyp    • COLOSTOMY REVISION  06/22/2017    Dr. Fairchild-[facility] from [facility]    • ILEOSTOMY  06/02/2016    ileostomy reversal-Dr. Fairchild-[facility]ed from [facility]    • LOW ANTERIOR BOWEL RESECTION  08/07/2015    Ileostomy-[facility]ed from [facility]    • PACEMAKER IMPLANTATION     • SIGMOIDOSCOPY  01/21/2017    with colostomy-Dr. Fairchild-[facility]ed from [facility]    • VENOUS ACCESS DEVICE (PORT) INSERTION Right 05/2015    Subclavian infuse a port placement-[facility]ed from [facility]    • VENOUS ACCESS DEVICE (PORT) REMOVAL  06/02/2016  "     Allergies:  No Known Allergies  Home Meds:  Current Meds:     Current Outpatient Medications:   •  aspirin (aspirin) 81 MG EC tablet, Daily., Disp: , Rfl:   •  lisinopril (PRINIVIL,ZESTRIL) 20 MG tablet, TAKE 1 TABLET BY MOUTH DAILY, Disp: 90 tablet, Rfl: 0  •  metoprolol succinate XL (TOPROL-XL) 25 MG 24 hr tablet, TAKE 1 TABLET BY MOUTH DAILY, Disp: 90 tablet, Rfl: 1  •  amLODIPine (NORVASC) 10 MG tablet, Take 1 tablet by mouth Daily., Disp: 90 tablet, Rfl: 3  Social History:   Social History     Tobacco Use   • Smoking status: Never   • Smokeless tobacco: Never   Substance Use Topics   • Alcohol use: No      Family History:  Family History   Problem Relation Age of Onset   • Stroke Mother    • Stroke Father    • Heart attack Sister    • Heart disease Other    • Hypertension Other               ROS  All other systems are negative         Objective:     Physical Exam  BP (!) 125/101 (BP Location: Right arm)   Pulse 65   Ht 170.2 cm (67\")   Wt 65.3 kg (144 lb)   SpO2 96%   BMI 22.55 kg/m²   General:  Appears in no acute distress  Eyes: Sclera is anicteric,  conjunctiva is clear   HEENT:  No JVD.  No carotid bruits  Respiratory: Respirations regular and unlabored at rest.  Clear to auscultation  Cardiovascular: S1,S2 Regular rate and rhythm. No murmur, rub or gallop auscultated.   Extremities: No digital clubbing or cyanosis, no edema  Skin: Color pink. Skin warm and dry to touch. No rashes  No xanthoma  Neuro: Alert and awake.    Lab Reviewed:         Gino Britton MD  1/11/2023 11:17 EST      EMR Dragon/Transcription:   \"Dictated utilizing Dragon dictation\".        "

## 2023-02-15 ENCOUNTER — OFFICE VISIT (OUTPATIENT)
Dept: CARDIOLOGY | Facility: CLINIC | Age: 63
End: 2023-02-15
Payer: MEDICARE

## 2023-02-15 VITALS
DIASTOLIC BLOOD PRESSURE: 83 MMHG | HEIGHT: 67 IN | WEIGHT: 142 LBS | BODY MASS INDEX: 22.29 KG/M2 | SYSTOLIC BLOOD PRESSURE: 120 MMHG | OXYGEN SATURATION: 100 % | HEART RATE: 67 BPM

## 2023-02-15 DIAGNOSIS — Z95.0 PACEMAKER: ICD-10-CM

## 2023-02-15 DIAGNOSIS — R00.1 BRADYCARDIA, SINUS: ICD-10-CM

## 2023-02-15 DIAGNOSIS — I10 PRIMARY HYPERTENSION: Primary | ICD-10-CM

## 2023-02-15 DIAGNOSIS — I42.0 DILATED CARDIOMYOPATHY: ICD-10-CM

## 2023-02-15 PROCEDURE — 99213 OFFICE O/P EST LOW 20 MIN: CPT | Performed by: NURSE PRACTITIONER

## 2023-02-15 NOTE — PROGRESS NOTES
Subjective:     Encounter Date:02/15/2023      Patient ID: Dave Aguilar is a 62 y.o. male.    Chief Complaint: one  Month follow up hypertension     History of Present Illness     Mr. Dave Aguilar has PMH of     # recurrent syncope  # severe bradycardia, status post MRI safe Medtronic permanent pacemaker 05/13/2015  # blood loss anemia, colorectal adeno CA on chemo and XRT  # mildly elevated glucose  # Rectal cancer status post chemo August 2015       here for one month follow-up for hypertension.  Patient denies any chest pain, shortness of breath, edema or palpitations. His blood pressure was high last visit with Dr. Britton and amlodipine was added.     Today's blood pressure is 120/83 HR 67 oxygen 100% on room air.     Family at bedside reports blood pressure controlled at home 120s systolic          Lab Review:     Patient had an echocardiogram 8/7/2020 done showing cardiomyopathy LVEF of 30 to 35%.  Patient's labs from 3/25/2020 reveal UA without proteinuria.  CMP, CBC from 2/20/2020 normal, labs from 8/10/2020 revealed CMP normal except for glucose of 107.  Labs from 8/9/2021 reveal normal CBC and CMP  8/8/2022: CBC and CMP unremarkable       The following portions of the patient's history were reviewed and updated as appropriate: allergies, current medications, past family history, past medical history, past social history, past surgical history and problem list.    Review of Systems   Constitutional: Negative for malaise/fatigue.   Cardiovascular: Negative for chest pain, dyspnea on exertion, leg swelling and palpitations.   Respiratory: Negative for cough and shortness of breath.    Gastrointestinal: Negative for abdominal pain, nausea and vomiting.   Neurological: Negative for dizziness, focal weakness, headaches, light-headedness and numbness.   All other systems reviewed and are negative.    Past Medical History:   Diagnosis Date   • Bradycardia     Hx of bradycardia-[facility]ed [facility]  "   • Hypertension    • Pacemaker 05/2015   • Rectal cancer (HCC)      Past Surgical History:   Procedure Laterality Date   • COLECTOMY PARTIAL / TOTAL  06/22/2017    partial colectomy with takedown of colostomy/new colostomy-Dr. Fairchild-[facility]   • COLONOSCOPY N/A 10/18/2022    Procedure: COLONOSCOPY with biopsy x1 area.;  Surgeon: Janes Treadwell MD;  Location: [facility] ENDOSCOPY;  Service: Gastroenterology;  Laterality: N/A;  Post- congested fold vs polyp    • COLOSTOMY REVISION  06/22/2017    Dr. Fairchild-Abstracted from [facility].    • ILEOSTOMY  06/02/2016    ileostomy reversal-Dr. Fairchild-Abstracted from [facility].    • LOW ANTERIOR BOWEL RESECTION  08/07/2015    Ileostomy-Abstracted from [facility].    • PACEMAKER IMPLANTATION     • SIGMOIDOSCOPY  01/21/2017    with colostomy-Dr. Fairchild-Abstracted from [facility].    • VENOUS ACCESS DEVICE (PORT) INSERTION Right 05/2015    Subclavian infuse a port placement-[facility]ed [facility]    • VENOUS ACCESS DEVICE (PORT) REMOVAL  06/02/2016     /83   Pulse 67   Ht 170.2 cm (67\")   Wt 64.4 kg (142 lb)   SpO2 100%   BMI 22.24 kg/m²   Family History   Problem Relation Age of Onset   • Stroke Mother    • Stroke Father    • Heart attack Sister    • Heart disease Other    • Hypertension Other        Current Outpatient Medications:   •  amLODIPine (NORVASC) 10 MG tablet, Take 1 tablet by mouth Daily., Disp: 90 tablet, Rfl: 3  •  aspirin (aspirin) 81 MG EC tablet, Daily., Disp: , Rfl:   •  lisinopril (PRINIVIL,ZESTRIL) 20 MG tablet, TAKE 1 TABLET BY MOUTH DAILY, Disp: 90 tablet, Rfl: 0  •  metoprolol succinate XL (TOPROL-XL) 25 MG 24 hr tablet, TAKE 1 TABLET BY MOUTH DAILY, Disp: 90 tablet, Rfl: 1  No Known Allergies  Social History     Socioeconomic History   • Marital status: Single   Tobacco Use   • Smoking status: Never   • Smokeless tobacco: Never   Vaping Use   • Vaping Use: Never used   Substance and Sexual Activity   • Alcohol use: No   • Drug use: No   • Sexual activity: Defer "                Objective:     Vitals reviewed.   Constitutional:       Appearance: Healthy appearance. Not in distress.   Neck:      Vascular: No JVR. JVD normal.   Pulmonary:      Effort: Pulmonary effort is normal.      Breath sounds: Normal breath sounds. No wheezing. No rhonchi. No rales.   Chest:      Chest wall: Not tender to palpatation.   Cardiovascular:      PMI at left midclavicular line. Normal rate. Regular rhythm. Normal S1. Normal S2.      Murmurs: There is no murmur.      No gallop. No click. No rub.   Pulses:     Intact distal pulses.   Edema:     Peripheral edema absent.   Abdominal:      General: Bowel sounds are normal.      Palpations: Abdomen is soft.      Tenderness: There is no abdominal tenderness.   Musculoskeletal: Normal range of motion.         General: No tenderness. Skin:     General: Skin is warm and dry.   Neurological:      General: No focal deficit present.      Mental Status: Alert and oriented to person, place and time.       Procedures                  Assessment:     [facility]     Diagnosis Plan   1. Primary hypertension        2. Dilated cardiomyopathy (HCC)        3. Bradycardia, sinus        4. Pacemaker                       Plan:       Continue current management for hypertension and cardiomyopathy with metoprolol succ 25mg daily, lisinopril 20mg daily and amlodipine 10mg daily  Will follow up in 6 months patient's cardiomyopathy is stable.   Previous EF 30%, patient will need pacemaker upgrade to ICD at some point.     - Daily weight:  same time every day, same clothing   - Low Salt (sodium) diet   - Watch fluid intake         Electronically signed by ANDRADE Orr, 02/16/23, 11:58 AM EST.            This document is intended for medical expert use only.  Reading of this document by patients and/or patient's family without participating medical staff guidance may result in misinterpretation and unintended morbidity. Any interpretation of such data is the responsibility of  the patient and/or family member responsible for the patient in concert with their primary or specialist providers, not to be left for sources of online search as such as [email or web address], Google or similar queries.  Relying on these approaches to knowledge may result in misinterpretation, misguided goals of care and even death should patient or family members try recommendations outside of the realm of professional medical care in a supervised inpatient environment.

## 2023-03-14 RX ORDER — METOPROLOL SUCCINATE 25 MG/1
TABLET, EXTENDED RELEASE ORAL
Qty: 90 TABLET | Refills: 3 | Status: SHIPPED | OUTPATIENT
Start: 2023-03-14

## 2023-03-14 RX ORDER — LISINOPRIL 20 MG/1
20 TABLET ORAL DAILY
Qty: 90 TABLET | Refills: 3 | Status: SHIPPED | OUTPATIENT
Start: 2023-03-14

## 2023-03-14 NOTE — TELEPHONE ENCOUNTER
Rx Refill Note  Requested Prescriptions     Pending Prescriptions Disp Refills   • metoprolol succinate XL (TOPROL-XL) 25 MG 24 hr tablet [Pharmacy Med Name: METOPROLOL ER SUCCINATE 25MG TABS] 90 tablet 3     Sig: TAKE 1 TABLET BY MOUTH DAILY   • lisinopril (PRINIVIL,ZESTRIL) 20 MG tablet [Pharmacy Med Name: LISINOPRIL 20MG TABLETS] 90 tablet 3     Sig: TAKE 1 TABLET BY MOUTH DAILY      Last office visit with prescribing clinician: 1/11/2023   Last telemedicine visit with prescribing clinician: 7/11/2023   Next office visit with prescribing clinician: 1/17/2024                         Would you like a call back once the refill request has been completed: [] Yes [] No    If the office needs to give you a call back, can they leave a voicemail: [] Yes [] No    Page GRAY Gamboa  03/14/23, 08:34 EDT

## 2023-08-16 NOTE — PROGRESS NOTES
Subjective:     Encounter Date:02/15/2023      Patient ID: Dave Aguilar is a 63 y.o. male.    Chief Complaint: 6 month follow up hypertension, pacemaker     History of Present Illness     Mr. Dave Aguilar has PMH of     # Chronic HFrEF secondary to LV dysfunction (EF 30%)  # Moderate MR   # hypertension   # recurrent syncope- resolved   # severe bradycardia, status post MRI safe Medtronic permanent pacemaker 05/13/2015  # blood loss anemia, colorectal adeno CA on chemo and XRT  # mildly elevated glucose  # Rectal cancer status post chemo August 2015     Here for 6 month follow up for hypertension.  Patient denies any chest pain, shortness of breath or edema.  No lightheadedness or syncope.     Blood pressure today is 114/80 HR 63 oxygen 98% on room air.                Lab Review:     Patient had an echocardiogram 8/7/2020 done showing cardiomyopathy LVEF of 30 to 35%.  Patient's labs from 3/25/2020 reveal UA without proteinuria.  CMP, CBC from 2/20/2020 normal, labs from 8/10/2020 revealed CMP normal except for glucose of 107.  Labs from 8/9/2021 reveal normal CBC and CMP    8/8/2022: CBC and CMP unremarkable           The following portions of the patient's history were reviewed and updated as appropriate: allergies, current medications, past family history, past medical history, past social history, past surgical history and problem list.    Review of Systems   Constitutional: Negative for malaise/fatigue.   Cardiovascular:  Negative for chest pain, dyspnea on exertion, leg swelling and palpitations.   Respiratory:  Negative for cough and shortness of breath.    Gastrointestinal:  Negative for abdominal pain, nausea and vomiting.   Neurological:  Negative for dizziness, focal weakness, headaches, light-headedness and numbness.   All other systems reviewed and are negative.    Past Medical History:   Diagnosis Date    Bradycardia     Hx of bradycardia-[facility] from [facility]     Hypertension      "Pacemaker 05/2015    Rectal cancer      Past Surgical History:   Procedure Laterality Date    COLECTOMY PARTIAL / TOTAL  06/22/2017    partial colectomy with takedown of colostomy/new colostomy-Dr. Fairchild-[facility]ed from [facility]    COLONOSCOPY N/A 10/18/2022    Procedure: COLONOSCOPY with biopsy x1 area.;  Surgeon: Janes Treadwell MD;  Location: [facility] ENDOSCOPY;  Service: Gastroenterology;  Laterality: N/A;  Post- congested fold vs polyp     COLOSTOMY REVISION  06/22/2017    Dr. Fairchild-[facility]ed from [facility]     ILEOSTOMY  06/02/2016    ileostomy reversal-Dr. Fairchild-[facility]ed from [facility]     LOW ANTERIOR BOWEL RESECTION  08/07/2015    Ileostomy-[facility]ed from [facility]     PACEMAKER IMPLANTATION      SIGMOIDOSCOPY  01/21/2017    with colostomy-Dr. Fairchild-[facility]ed from [facility]     VENOUS ACCESS DEVICE (PORT) INSERTION Right 05/2015    Subclavian infuse a port placement-Abstracted from [facility]     VENOUS ACCESS DEVICE (PORT) REMOVAL  06/02/2016     /80 (BP Location: Left arm, Patient Position: Sitting, Cuff Size: Adult)   Pulse 63   Ht 170.2 cm (67\")   Wt 67.5 kg (148 lb 12 oz)   SpO2 98%   BMI 23.30 kg/mý   Family History   Problem Relation Age of Onset    Stroke Mother     Stroke Father     Heart attack Sister     Heart disease Other     Hypertension Other        Current Outpatient Medications:     amLODIPine (NORVASC) 10 MG tablet, Take 1 tablet by mouth Daily., Disp: 90 tablet, Rfl: 3    aspirin (aspirin) 81 MG EC tablet, Daily., Disp: , Rfl:     lisinopril (PRINIVIL,ZESTRIL) 20 MG tablet, TAKE 1 TABLET BY MOUTH DAILY, Disp: 90 tablet, Rfl: 3    metoprolol succinate XL (TOPROL-XL) 25 MG 24 hr tablet, TAKE 1 TABLET BY MOUTH DAILY, Disp: 90 tablet, Rfl: 3  No Known Allergies  Social History     Socioeconomic History    Marital status: Single   Tobacco Use    Smoking status: Never    Smokeless tobacco: Never   Vaping Use    Vaping Use: Never used   Substance and Sexual Activity    Alcohol use: No    Drug use: No    " Sexual activity: Defer                Objective:     Vitals reviewed.   Constitutional:       Appearance: Healthy appearance. Not in distress.   Neck:      Vascular: No JVR. JVD normal.   Pulmonary:      Effort: Pulmonary effort is normal.      Breath sounds: Normal breath sounds. No wheezing. No rhonchi. No rales.   Chest:      Chest wall: Not tender to palpatation.   Cardiovascular:      PMI at left midclavicular line. Normal rate. Regular rhythm. Normal S1. Normal S2.       Murmurs: There is a systolic murmur.      No gallop.  No click. No rub.   Pulses:     Intact distal pulses.   Edema:     Peripheral edema absent.   Abdominal:      General: Bowel sounds are normal.      Palpations: Abdomen is soft.      Tenderness: There is no abdominal tenderness.   Musculoskeletal: Normal range of motion.         General: No tenderness. Skin:     General: Skin is warm and dry.   Neurological:      General: No focal deficit present.      Mental Status: Alert and oriented to person, place and time.     Procedures                  Assessment:     [facility]       Diagnosis Plan   1. Primary hypertension        2. Dilated cardiomyopathy        3. Pacemaker        4. Moderate mitral regurgitation                         Plan:       Continue current management for hypertension and cardiomyopathy with metoprolol succ 25mg daily, lisinopril 20mg daily and amlodipine 10mg daily  Will follow up in 6 months patient's cardiomyopathy is stable.   Previous EF 30%, patient will need pacemaker upgrade to ICD at some point.   Would consider stopping amlodipine       - Daily weight:  same time every day, same clothing   - Low Salt (sodium) diet   - Watch fluid intake       No changes in medical therapy at this time.  Patient is stable.   Keep follow up and pacemaker interrogation appointment in Jan 2024  Pacemaker check in June showed pacemaker working well.     Electronically signed by ANDRADE Orr, 08/18/23, 10:31 AM EDT.              This  document is intended for medical expert use only.  Reading of this document by patients and/or patient's family without participating medical staff guidance may result in misinterpretation and unintended morbidity. Any interpretation of such data is the responsibility of the patient and/or family member responsible for the patient in concert with their primary or specialist providers, not to be left for sources of online search as such as [employer], [employer] or similar queries.  Relying on these approaches to knowledge may result in misinterpretation, misguided goals of care and even death should patient or family members try recommendations outside of the realm of professional medical care in a supervised inpatient environment.

## 2023-08-18 ENCOUNTER — OFFICE VISIT (OUTPATIENT)
Dept: CARDIOLOGY | Facility: CLINIC | Age: 63
End: 2023-08-18
Payer: MEDICARE

## 2023-08-18 VITALS
WEIGHT: 148.75 LBS | OXYGEN SATURATION: 98 % | HEIGHT: 67 IN | BODY MASS INDEX: 23.35 KG/M2 | SYSTOLIC BLOOD PRESSURE: 114 MMHG | DIASTOLIC BLOOD PRESSURE: 80 MMHG | HEART RATE: 63 BPM

## 2023-08-18 DIAGNOSIS — I42.0 DILATED CARDIOMYOPATHY: Chronic | ICD-10-CM

## 2023-08-18 DIAGNOSIS — I10 PRIMARY HYPERTENSION: Primary | ICD-10-CM

## 2023-08-18 DIAGNOSIS — Z95.0 PACEMAKER: ICD-10-CM

## 2023-08-18 DIAGNOSIS — I34.0 MODERATE MITRAL REGURGITATION: ICD-10-CM

## 2023-08-18 PROCEDURE — 99214 OFFICE O/P EST MOD 30 MIN: CPT | Performed by: NURSE PRACTITIONER

## 2023-08-18 PROCEDURE — 3074F SYST BP LT 130 MM HG: CPT | Performed by: NURSE PRACTITIONER

## 2023-08-18 PROCEDURE — 1160F RVW MEDS BY RX/DR IN RCRD: CPT | Performed by: NURSE PRACTITIONER

## 2023-08-18 PROCEDURE — 1159F MED LIST DOCD IN RCRD: CPT | Performed by: NURSE PRACTITIONER

## 2023-08-18 PROCEDURE — 3079F DIAST BP 80-89 MM HG: CPT | Performed by: NURSE PRACTITIONER

## 2024-01-02 RX ORDER — AMLODIPINE BESYLATE 10 MG/1
10 TABLET ORAL DAILY
Qty: 90 TABLET | Refills: 3 | Status: SHIPPED | OUTPATIENT
Start: 2024-01-02

## 2024-01-09 ENCOUNTER — TELEPHONE (OUTPATIENT)
Dept: CARDIOLOGY | Facility: CLINIC | Age: 64
End: 2024-01-09
Payer: MEDICARE

## 2024-01-09 DIAGNOSIS — R00.1 BRADYCARDIA: ICD-10-CM

## 2024-01-09 DIAGNOSIS — I10 PRIMARY HYPERTENSION: Primary | ICD-10-CM

## 2024-01-09 DIAGNOSIS — R06.00 DYSPNEA, UNSPECIFIED TYPE: ICD-10-CM

## 2024-01-09 NOTE — TELEPHONE ENCOUNTER
OV 1/17   Cmp lipid ordered   Expires 1/11  Called pt LMOM asking to get labs done in next couple days, if not call us and new orders will be put in

## 2024-01-15 NOTE — PROGRESS NOTES
Subjective:     Encounter Date:01/17/2024      Patient ID: Dave Aguilar is a 63 y.o. male.    Chief Complaint and history of present illness:     Follow-up for pacemaker, hypertension, cardiomyopathy     History of Present Illness  :        Mr. Dave Aguilar has PMH of     # recurrent syncope  # severe bradycardia, status post MRI safe Medtronic permanent pacemaker 05/13/2015  # blood loss anemia, colorectal adeno CA on chemo and XRT  # mildly elevated glucose  # Rectal cancer status post chemo August 2015      here for  follow-up.  Patient denies any chest pain or shortness of breath.     Patient's arterial blood pressure is 121/82, heart rate 63, O2 sat of 97% on room air.     Patient had an echocardiogram 8/7/2020 done showing cardiomyopathy LVEF of 30 to 35%.    Patient's labs from 3/25/2020 reveal UA without proteinuria.  CMP, CBC from 2/20/2020 normal, labs from 8/10/2020 revealed CMP normal except for glucose of 107.  Labs from 8/9/2021 reveal normal CBC and CMP         ASSESSMENT;     # CMP, mitral regurgitation  #  hypertension  #. Jay, syncope, S/P ppm  #. elevated blood glucose      PLAN;     Reviewed EKG results with patient.  Continue medical management with aspirin, lisinopril and metoprolol and amlodipine  Advised patient to check blood pressure at home.  Patient had pacemaker checked which is functioning well.  Reviewed hyperglycemia with patient, advised to follow-up with PMD for hyperglycemia.  Get labs from PMDs office.                   ECG 12 Lead    Date/Time: 1/17/2024 11:34 AM  Performed by: Gino Britton MD    Authorized by: Gino Britton MD  Comparison: compared with previous ECG from 1/11/2023  Comparison to previous ECG: EKG done today reviewed/interpreted by me reveals 100% a paced rhythm at the rate of 67 bpm, no new change compared EKG from 1/11/2023          Copied text in this portion of the note has been reviewed and is accurate as of  1/17/2024  The following portions of the patient's history were reviewed and updated as appropriate: allergies, current medications, past family history, past medical history, past social history, past surgical history and problem list.    Assessment:         [facility]       Diagnosis Plan   1. Dilated cardiomyopathy        2. Moderate mitral regurgitation        3. Essential hypertension               Plan:               Past Medical History:  Past Medical History:   Diagnosis Date    Bradycardia     Hx of bradycardia-[facility] from [facility]     Hypertension     Pacemaker 05/2015    Rectal cancer      Past Surgical History:  Past Surgical History:   Procedure Laterality Date    COLECTOMY PARTIAL / TOTAL  06/22/2017    partial colectomy with takedown of colostomy/new colostomy-Dr. Fairchild-[facility] from [facility]    COLONOSCOPY N/A 10/18/2022    Procedure: COLONOSCOPY with biopsy x1 area.;  Surgeon: Janes Treadwell MD;  Location: [facility] ENDOSCOPY;  Service: Gastroenterology;  Laterality: N/A;  Post- congested fold vs polyp     COLOSTOMY REVISION  06/22/2017    Dr. Fairchild-[facility] from [facility]     ILEOSTOMY  06/02/2016    ileostomy reversal-Dr. Fairchild-[facility]ed from [facility]     LOW ANTERIOR BOWEL RESECTION  08/07/2015    Ileostomy-[facility]ed from [facility]     PACEMAKER IMPLANTATION      SIGMOIDOSCOPY  01/21/2017    with colostomy-Dr. Fairchild-[facility]ed from [facility]     VENOUS ACCESS DEVICE (PORT) INSERTION Right 05/2015    Subclavian infuse a port placement-Abstracted from [facility]     VENOUS ACCESS DEVICE (PORT) REMOVAL  06/02/2016      Allergies:  No Known Allergies  Home Meds:  Current Meds:     Current Outpatient Medications:     amLODIPine (NORVASC) 10 MG tablet, TAKE 1 TABLET BY MOUTH DAILY, Disp: 90 tablet, Rfl: 3    aspirin (aspirin) 81 MG EC tablet, Daily., Disp: , Rfl:     lisinopril (PRINIVIL,ZESTRIL) 20 MG tablet, TAKE 1 TABLET BY MOUTH DAILY, Disp: 90 tablet, Rfl: 3    metoprolol succinate XL (TOPROL-XL) 25 MG 24 hr tablet, TAKE 1  "TABLET BY MOUTH DAILY, Disp: 90 tablet, Rfl: 3  Social History:   Social History     Tobacco Use    Smoking status: Never    Smokeless tobacco: Never   Substance Use Topics    Alcohol use: No      Family History:  Family History   Problem Relation Age of Onset    Stroke Mother     Stroke Father     Heart attack Sister     Heart disease Other     Hypertension Other               Review of Systems   Cardiovascular:  Negative for chest pain, leg swelling and palpitations.   Respiratory:  Negative for shortness of breath.    Neurological:  Negative for dizziness and numbness.     All other systems are negative         Objective:     Physical Exam  /82 (BP Location: Left arm, Patient Position: Sitting, Cuff Size: Adult)   Pulse 63   Ht 170.2 cm (67\")   Wt 64.4 kg (142 lb)   SpO2 97%   BMI 22.24 kg/m²   General:  Appears in no acute distress  Eyes: Sclera is anicteric,  conjunctiva is clear   HEENT:  No JVD.  No carotid bruits  Respiratory: Respirations regular and unlabored at rest.  Clear to auscultation  Cardiovascular: S1,S2 Regular rate and rhythm. .   Extremities: Colostomy bag present on abdomen.  Skin: Color pink. Skin warm and dry to touch. No rashes  No xanthoma  Neuro: Alert and awake.    Lab Reviewed:         Gino Britton MD  1/17/2024 11:35 EST      EMR Dragon/Transcription:   \"Dictated utilizing Dragon dictation\".        "

## 2024-01-17 ENCOUNTER — OFFICE VISIT (OUTPATIENT)
Dept: CARDIOLOGY | Facility: CLINIC | Age: 64
End: 2024-01-17
Payer: MEDICARE

## 2024-01-17 ENCOUNTER — CLINICAL SUPPORT NO REQUIREMENTS (OUTPATIENT)
Dept: CARDIOLOGY | Facility: CLINIC | Age: 64
End: 2024-01-17
Payer: MEDICARE

## 2024-01-17 VITALS
HEIGHT: 67 IN | SYSTOLIC BLOOD PRESSURE: 121 MMHG | HEART RATE: 63 BPM | DIASTOLIC BLOOD PRESSURE: 82 MMHG | OXYGEN SATURATION: 97 % | BODY MASS INDEX: 22.29 KG/M2 | WEIGHT: 142 LBS

## 2024-01-17 DIAGNOSIS — I42.0 DILATED CARDIOMYOPATHY: Primary | ICD-10-CM

## 2024-01-17 DIAGNOSIS — Z95.0 PACEMAKER: ICD-10-CM

## 2024-01-17 DIAGNOSIS — I10 ESSENTIAL HYPERTENSION: ICD-10-CM

## 2024-01-17 DIAGNOSIS — R00.1 BRADYCARDIA: Primary | ICD-10-CM

## 2024-01-17 DIAGNOSIS — I34.0 MODERATE MITRAL REGURGITATION: ICD-10-CM

## 2024-01-17 PROCEDURE — 1159F MED LIST DOCD IN RCRD: CPT | Performed by: INTERNAL MEDICINE

## 2024-01-17 PROCEDURE — 3079F DIAST BP 80-89 MM HG: CPT | Performed by: INTERNAL MEDICINE

## 2024-01-17 PROCEDURE — 1160F RVW MEDS BY RX/DR IN RCRD: CPT | Performed by: INTERNAL MEDICINE

## 2024-01-17 PROCEDURE — 3074F SYST BP LT 130 MM HG: CPT | Performed by: INTERNAL MEDICINE

## 2024-01-17 PROCEDURE — 99214 OFFICE O/P EST MOD 30 MIN: CPT | Performed by: INTERNAL MEDICINE

## 2024-01-17 PROCEDURE — 93000 ELECTROCARDIOGRAM COMPLETE: CPT | Performed by: INTERNAL MEDICINE

## 2024-03-08 RX ORDER — LISINOPRIL 20 MG/1
20 TABLET ORAL DAILY
Qty: 90 TABLET | Refills: 2 | Status: SHIPPED | OUTPATIENT
Start: 2024-03-08

## 2024-03-08 RX ORDER — METOPROLOL SUCCINATE 25 MG/1
TABLET, EXTENDED RELEASE ORAL
Qty: 90 TABLET | Refills: 2 | Status: SHIPPED | OUTPATIENT
Start: 2024-03-08

## 2024-03-08 NOTE — TELEPHONE ENCOUNTER
Rx Refill Note  Requested Prescriptions     Pending Prescriptions Disp Refills    metoprolol succinate XL (TOPROL-XL) 25 MG 24 hr tablet [Pharmacy Med Name: METOPROLOL ER SUCCINATE 25MG TABS] 90 tablet 3     Sig: TAKE 1 TABLET BY MOUTH DAILY    lisinopril (PRINIVIL,ZESTRIL) 20 MG tablet [Pharmacy Med Name: LISINOPRIL 20MG TABLETS] 90 tablet 3     Sig: TAKE 1 TABLET BY MOUTH DAILY      Last office visit with prescribing clinician: 1/17/2024   Last telemedicine visit with prescribing clinician: Visit date not found   Next office visit with prescribing clinician: 1/27/2025                         Would you like a call back once the refill request has been completed: [] Yes [] No    If the office needs to give you a call back, can they leave a voicemail: [] Yes [] No    Rox Montague MA  03/08/24, 08:49 EST

## 2024-12-03 RX ORDER — LISINOPRIL 20 MG/1
20 TABLET ORAL DAILY
Qty: 90 TABLET | Refills: 0 | Status: SHIPPED | OUTPATIENT
Start: 2024-12-03

## 2024-12-03 RX ORDER — METOPROLOL SUCCINATE 25 MG/1
TABLET, EXTENDED RELEASE ORAL
Qty: 90 TABLET | Refills: 0 | Status: SHIPPED | OUTPATIENT
Start: 2024-12-03

## 2024-12-03 NOTE — TELEPHONE ENCOUNTER
Rx Refill Note  Requested Prescriptions     Pending Prescriptions Disp Refills    metoprolol succinate XL (TOPROL-XL) 25 MG 24 hr tablet [Pharmacy Med Name: METOPROLOL ER SUCCINATE 25MG TABS] 90 tablet 2     Sig: TAKE 1 TABLET BY MOUTH DAILY    lisinopril (PRINIVIL,ZESTRIL) 20 MG tablet [Pharmacy Med Name: LISINOPRIL 20MG TABLETS] 90 tablet 2     Sig: TAKE 1 TABLET BY MOUTH DAILY      Last office visit with prescribing clinician: 1/17/2024   Last telemedicine visit with prescribing clinician: Visit date not found   Next office visit with prescribing clinician: 1/27/2025                         Would you like a call back once the refill request has been completed: [] Yes [] No    If the office needs to give you a call back, can they leave a voicemail: [] Yes [] No    Rox Montague MA  12/03/24, 09:57 EST

## 2024-12-16 RX ORDER — AMLODIPINE BESYLATE 10 MG/1
10 TABLET ORAL DAILY
Qty: 90 TABLET | Refills: 0 | Status: SHIPPED | OUTPATIENT
Start: 2024-12-16

## 2024-12-16 NOTE — TELEPHONE ENCOUNTER
Rx Refill Note  Requested Prescriptions     Pending Prescriptions Disp Refills    amLODIPine (NORVASC) 10 MG tablet [Pharmacy Med Name: AMLODIPINE BESYLATE 10MG TABLETS] 90 tablet 0     Sig: TAKE 1 TABLET BY MOUTH DAILY      Last office visit with prescribing clinician: 1/17/2024   Last telemedicine visit with prescribing clinician: Visit date not found   Next office visit with prescribing clinician: 1/27/2025                         Would you like a call back once the refill request has been completed: [] Yes [] No    If the office needs to give you a call back, can they leave a voicemail: [] Yes [] No    Dorys Romero MA  12/16/24, 11:11 EST

## 2025-01-17 ENCOUNTER — TELEPHONE (OUTPATIENT)
Dept: CARDIOLOGY | Facility: CLINIC | Age: 65
End: 2025-01-17
Payer: MEDICARE

## 2025-01-17 DIAGNOSIS — I10 ESSENTIAL HYPERTENSION: ICD-10-CM

## 2025-01-17 DIAGNOSIS — I10 PRIMARY HYPERTENSION: Primary | ICD-10-CM

## 2025-01-17 DIAGNOSIS — I34.0 MODERATE MITRAL REGURGITATION: ICD-10-CM

## 2025-01-17 DIAGNOSIS — I42.0 DILATED CARDIOMYOPATHY: ICD-10-CM

## 2025-01-22 ENCOUNTER — LAB (OUTPATIENT)
Dept: LAB | Facility: HOSPITAL | Age: 65
End: 2025-01-22
Payer: MEDICARE

## 2025-01-22 LAB
ALBUMIN SERPL-MCNC: 4.4 G/DL (ref 3.5–5.2)
ALBUMIN/GLOB SERPL: 1.3 G/DL
ALP SERPL-CCNC: 107 U/L (ref 39–117)
ALT SERPL W P-5'-P-CCNC: 12 U/L (ref 1–41)
ANION GAP SERPL CALCULATED.3IONS-SCNC: 8.8 MMOL/L (ref 5–15)
AST SERPL-CCNC: 21 U/L (ref 1–40)
BILIRUB SERPL-MCNC: 0.9 MG/DL (ref 0–1.2)
BUN SERPL-MCNC: 15 MG/DL (ref 8–23)
BUN/CREAT SERPL: 12.2 (ref 7–25)
CALCIUM SPEC-SCNC: 9.3 MG/DL (ref 8.6–10.5)
CHLORIDE SERPL-SCNC: 107 MMOL/L (ref 98–107)
CHOLEST SERPL-MCNC: 169 MG/DL (ref 0–200)
CO2 SERPL-SCNC: 25.2 MMOL/L (ref 22–29)
CREAT SERPL-MCNC: 1.23 MG/DL (ref 0.76–1.27)
EGFRCR SERPLBLD CKD-EPI 2021: 64.7 ML/MIN/1.73
GLOBULIN UR ELPH-MCNC: 3.3 GM/DL
GLUCOSE SERPL-MCNC: 96 MG/DL (ref 65–99)
HDLC SERPL-MCNC: 36 MG/DL (ref 40–60)
LDLC SERPL CALC-MCNC: 111 MG/DL (ref 0–100)
LDLC/HDLC SERPL: 3.01 {RATIO}
POTASSIUM SERPL-SCNC: 4.6 MMOL/L (ref 3.5–5.2)
PROT SERPL-MCNC: 7.7 G/DL (ref 6–8.5)
SODIUM SERPL-SCNC: 141 MMOL/L (ref 136–145)
TRIGL SERPL-MCNC: 123 MG/DL (ref 0–150)
VLDLC SERPL-MCNC: 22 MG/DL (ref 5–40)

## 2025-01-22 PROCEDURE — 80053 COMPREHEN METABOLIC PANEL: CPT | Performed by: INTERNAL MEDICINE

## 2025-01-22 PROCEDURE — 80061 LIPID PANEL: CPT | Performed by: INTERNAL MEDICINE

## 2025-01-27 ENCOUNTER — OFFICE VISIT (OUTPATIENT)
Dept: CARDIOLOGY | Facility: CLINIC | Age: 65
End: 2025-01-27
Payer: MEDICARE

## 2025-01-27 ENCOUNTER — CLINICAL SUPPORT NO REQUIREMENTS (OUTPATIENT)
Dept: CARDIOLOGY | Facility: CLINIC | Age: 65
End: 2025-01-27
Payer: MEDICARE

## 2025-01-27 VITALS
DIASTOLIC BLOOD PRESSURE: 73 MMHG | OXYGEN SATURATION: 96 % | HEIGHT: 67 IN | SYSTOLIC BLOOD PRESSURE: 106 MMHG | BODY MASS INDEX: 21.66 KG/M2 | HEART RATE: 66 BPM | WEIGHT: 138 LBS

## 2025-01-27 DIAGNOSIS — E78.5 DYSLIPIDEMIA: ICD-10-CM

## 2025-01-27 DIAGNOSIS — I10 ESSENTIAL HYPERTENSION: Primary | ICD-10-CM

## 2025-01-27 DIAGNOSIS — Z95.0 PACEMAKER: ICD-10-CM

## 2025-01-27 DIAGNOSIS — I42.0 DILATED CARDIOMYOPATHY: ICD-10-CM

## 2025-01-27 DIAGNOSIS — I34.0 MODERATE MITRAL REGURGITATION: ICD-10-CM

## 2025-01-27 DIAGNOSIS — R00.1 BRADYCARDIA: Primary | ICD-10-CM

## 2025-01-27 PROCEDURE — 1159F MED LIST DOCD IN RCRD: CPT | Performed by: INTERNAL MEDICINE

## 2025-01-27 PROCEDURE — 3078F DIAST BP <80 MM HG: CPT | Performed by: INTERNAL MEDICINE

## 2025-01-27 PROCEDURE — 99214 OFFICE O/P EST MOD 30 MIN: CPT | Performed by: INTERNAL MEDICINE

## 2025-01-27 PROCEDURE — 93280 PM DEVICE PROGR EVAL DUAL: CPT | Performed by: INTERNAL MEDICINE

## 2025-01-27 PROCEDURE — 1160F RVW MEDS BY RX/DR IN RCRD: CPT | Performed by: INTERNAL MEDICINE

## 2025-01-27 PROCEDURE — 93000 ELECTROCARDIOGRAM COMPLETE: CPT | Performed by: INTERNAL MEDICINE

## 2025-01-27 PROCEDURE — 3074F SYST BP LT 130 MM HG: CPT | Performed by: INTERNAL MEDICINE

## 2025-01-27 RX ORDER — PRAVASTATIN SODIUM 20 MG
20 TABLET ORAL DAILY
Qty: 90 TABLET | Refills: 3 | Status: SHIPPED | OUTPATIENT
Start: 2025-01-27

## 2025-01-27 NOTE — PROGRESS NOTES
Subjective:     Encounter Date:01/27/2025      Patient ID: Dave Aguilar is a 64 y.o. male.    Chief Complaint and history of present illness:       Follow-up for pacemaker, hypertension, mitral regurgitation, dyslipidemia, cardiomyopathy     History of Present Illness  :        Mr. Dave Aguilar has PMH of     # recurrent syncope  # severe bradycardia, status post MRI safe Medtronic permanent pacemaker 05/13/2015  # blood loss anemia, colorectal adeno CA on chemo and XRT  # mildly elevated glucose  # Rectal cancer status post chemo August 2015      here for  follow-up.  Patient denies any chest pain or shortness of breath.     Patient's arterial blood pressure is 106/73, heart rate 66, O2 sat of 96% on room air.     Patient had an echocardiogram 8/7/2020 done showing cardiomyopathy LVEF of 30 to 35%.     Patient's labs from 3/25/2020 reveal UA without proteinuria.  CMP, CBC from 2/20/2020 normal, labs from 8/10/2020 revealed CMP normal except for glucose of 107.  Labs from 8/9/2021 reveal normal CBC and CMP labs from 1/22/2025 reveal normal CMP.  Lipid profile with cholesterol 169, triglycerides 123, HDL low at 36 .         ASSESSMENT;     # CMP, mitral regurgitation  #  hypertension  #. Jay, syncope, S/P ppm  #. elevated blood glucose  #.  Dyslipidemia      PLAN;     Reviewed EKG results with patient.  Continue medical management with amlodipine metoprolol lisinopril.  Will add aspirin and pravastatin to medical regimen.  Risk-benefit alternatives discussed.  Pacemaker check is revealing normal function.  Patient has approximately 7-month battery life.  Will follow-up in pacemaker clinic.  Will check a follow-up echo to monitor MR and assess LV function.  Follow-up with PMD for hyperglycemia and dyslipidemia.                   ECG 12 Lead    Date/Time: 1/27/2025 12:07 PM  Performed by: Gino Britton MD    Authorized by: Gino Britton MD  Comparison: compared with  previous ECG from 1/17/2024  Comparison to previous ECG: EKG done today reviewed/interpreted by me reveals sinus rhythm with 100% a paced rhythm with rate of 64 bpm with probable LVH, no significant change compared to EKG from 1/17/2024          Copied text in this portion of the note has been reviewed and is accurate as of 1/27/2025  The following portions of the patient's history were reviewed and updated as appropriate: allergies, current medications, past family history, past medical history, past social history, past surgical history and problem list.    Assessment:         [facility]       Diagnosis Plan   1. Essential hypertension  Adult Transthoracic Echo Complete W/ Cont if Necessary Per Protocol      2. Moderate mitral regurgitation  Adult Transthoracic Echo Complete W/ Cont if Necessary Per Protocol      3. Dilated cardiomyopathy  Adult Transthoracic Echo Complete W/ Cont if Necessary Per Protocol      4. Pacemaker  Adult Transthoracic Echo Complete W/ Cont if Necessary Per Protocol      5. Dyslipidemia  Adult Transthoracic Echo Complete W/ Cont if Necessary Per Protocol             Plan:               Past Medical History:  Past Medical History:   Diagnosis Date    Bradycardia     Hx of bradycardia-[facility]ed from [facility]     Hypertension     Pacemaker 05/2015    Rectal cancer      Past Surgical History:  Past Surgical History:   Procedure Laterality Date    COLECTOMY PARTIAL / TOTAL  06/22/2017    partial colectomy with takedown of colostomy/new colostomy-Dr. Fairchild-[facility]ed from [facility]    COLONOSCOPY N/A 10/18/2022    Procedure: COLONOSCOPY with biopsy x1 area.;  Surgeon: Janes Treadwell MD;  Location: [facility] ENDOSCOPY;  Service: Gastroenterology;  Laterality: N/A;  Post- congested fold vs polyp     COLOSTOMY REVISION  06/22/2017    Dr. Fairchild-[facility]ed from [facility]     ILEOSTOMY  06/02/2016    ileostomy reversal-Dr. Fairchild-Abstracted from [facility].     LOW ANTERIOR BOWEL RESECTION  08/07/2015    Ileostomy-Abstracted  "from [facility].     PACEMAKER IMPLANTATION      SIGMOIDOSCOPY  01/21/2017    with colostomy-Dr. Fairchild-Abstracted from [facility].     VENOUS ACCESS DEVICE (PORT) INSERTION Right 05/2015    Subclavian infuse a port placement-Abstracted from [facility].     VENOUS ACCESS DEVICE (PORT) REMOVAL  06/02/2016      Allergies:  No Known Allergies  Home Meds:  Current Meds:     Current Outpatient Medications:     amLODIPine (NORVASC) 10 MG tablet, TAKE 1 TABLET BY MOUTH DAILY, Disp: 90 tablet, Rfl: 0    aspirin (aspirin) 81 MG EC tablet, Daily., Disp: , Rfl:     lisinopril (PRINIVIL,ZESTRIL) 20 MG tablet, TAKE 1 TABLET BY MOUTH DAILY, Disp: 90 tablet, Rfl: 0    metoprolol succinate XL (TOPROL-XL) 25 MG 24 hr tablet, TAKE 1 TABLET BY MOUTH DAILY, Disp: 90 tablet, Rfl: 0    pravastatin (Pravachol) 20 MG tablet, Take 1 tablet by mouth Daily., Disp: 90 tablet, Rfl: 3  Social History:   Social History     Tobacco Use    Smoking status: Never     Passive exposure: Never    Smokeless tobacco: Never   Substance Use Topics    Alcohol use: No      Family History:  Family History   Problem Relation Age of Onset    Stroke Mother     Stroke Father     Heart attack Sister     Heart disease Other     Hypertension Other               Review of Systems   Constitutional: Negative for malaise/fatigue.   Cardiovascular:  Negative for chest pain, leg swelling and palpitations.   Respiratory:  Negative for shortness of breath.    Skin:  Negative for rash.   Neurological:  Negative for dizziness, light-headedness and numbness.     All other systems are negative         Objective:     Physical Exam  /73   Pulse 66   Ht 170.2 cm (67\")   Wt 62.6 kg (138 lb)   SpO2 96%   BMI 21.61 kg/m²   General:  Appears in no acute distress  Eyes: Sclera is anicteric,  conjunctiva is clear   HEENT:  No JVD.  No carotid bruits  Respiratory: Respirations regular and unlabored at rest.  Clear to auscultation  Cardiovascular: S1,S2 Regular rate and rhythm. .   Extremities: No " "digital clubbing or cyanosis, no edema  Skin: Color pink. Skin warm and dry to touch. No rashes  No xanthoma  Neuro: Alert and awake.    Lab Reviewed:         Gino Britton MD  1/27/2025 12:08 EST      EMR Dragon/Transcription:   \"Dictated utilizing Dragon dictation\".        "

## 2025-02-04 ENCOUNTER — HOSPITAL ENCOUNTER (OUTPATIENT)
Dept: CARDIOLOGY | Facility: HOSPITAL | Age: 65
Discharge: HOME | End: 2025-02-04
Admitting: INTERNAL MEDICINE
Payer: MEDICARE

## 2025-02-04 DIAGNOSIS — E78.5 DYSLIPIDEMIA: ICD-10-CM

## 2025-02-04 DIAGNOSIS — I42.0 DILATED CARDIOMYOPATHY: ICD-10-CM

## 2025-02-04 DIAGNOSIS — I34.0 MODERATE MITRAL REGURGITATION: ICD-10-CM

## 2025-02-04 DIAGNOSIS — I10 ESSENTIAL HYPERTENSION: ICD-10-CM

## 2025-02-04 DIAGNOSIS — Z95.0 PACEMAKER: ICD-10-CM

## 2025-02-04 LAB
AORTIC DIMENSIONLESS INDEX: 0.75 (DI)
AV MEAN PRESS GRAD SYS DOP V1V2: 3 MMHG
AV VMAX SYS DOP: 119 CM/SEC
BH CV ECHO LEFT VENTRICLE GLOBAL LONGITUDINAL STRAIN: -14.3 %
BH CV ECHO MEAS - ACS: 2.3 CM
BH CV ECHO MEAS - AO MAX PG: 5.7 MMHG
BH CV ECHO MEAS - AO V2 VTI: 25.8 CM
BH CV ECHO MEAS - AVA(I,D): 2.33 CM2
BH CV ECHO MEAS - EDV(CUBED): 157.5 ML
BH CV ECHO MEAS - EDV(MOD-SP2): 87.9 ML
BH CV ECHO MEAS - EDV(MOD-SP4): 100 ML
BH CV ECHO MEAS - EF(MOD-SP2): 47 %
BH CV ECHO MEAS - EF(MOD-SP4): 48.5 %
BH CV ECHO MEAS - ESV(CUBED): 68.9 ML
BH CV ECHO MEAS - ESV(MOD-SP2): 46.6 ML
BH CV ECHO MEAS - ESV(MOD-SP4): 51.5 ML
BH CV ECHO MEAS - FS: 24.1 %
BH CV ECHO MEAS - IVS/LVPW: 1 CM
BH CV ECHO MEAS - IVSD: 0.8 CM
BH CV ECHO MEAS - LA DIMENSION: 2.7 CM
BH CV ECHO MEAS - LAT PEAK E' VEL: 7.8 CM/SEC
BH CV ECHO MEAS - LV DIASTOLIC VOL/BSA (35-75): 57.9 CM2
BH CV ECHO MEAS - LV MASS(C)D: 155 GRAMS
BH CV ECHO MEAS - LV MAX PG: 3.2 MMHG
BH CV ECHO MEAS - LV MEAN PG: 2 MMHG
BH CV ECHO MEAS - LV SYSTOLIC VOL/BSA (12-30): 29.8 CM2
BH CV ECHO MEAS - LV V1 MAX: 89.1 CM/SEC
BH CV ECHO MEAS - LV V1 VTI: 19.1 CM
BH CV ECHO MEAS - LVIDD: 5.4 CM
BH CV ECHO MEAS - LVIDS: 4.1 CM
BH CV ECHO MEAS - LVOT AREA: 3.1 CM2
BH CV ECHO MEAS - LVOT DIAM: 2 CM
BH CV ECHO MEAS - LVPWD: 0.8 CM
BH CV ECHO MEAS - MED PEAK E' VEL: 7.9 CM/SEC
BH CV ECHO MEAS - MR MAX PG: 71.2 MMHG
BH CV ECHO MEAS - MR MAX VEL: 422 CM/SEC
BH CV ECHO MEAS - MV A MAX VEL: 115 CM/SEC
BH CV ECHO MEAS - MV DEC SLOPE: 244 CM/SEC2
BH CV ECHO MEAS - MV DEC TIME: 0.23 SEC
BH CV ECHO MEAS - MV E MAX VEL: 77.6 CM/SEC
BH CV ECHO MEAS - MV E/A: 0.67
BH CV ECHO MEAS - MV MAX PG: 4.8 MMHG
BH CV ECHO MEAS - MV MEAN PG: 2 MMHG
BH CV ECHO MEAS - MV P1/2T: 94 MSEC
BH CV ECHO MEAS - MV V2 VTI: 31.3 CM
BH CV ECHO MEAS - MVA(P1/2T): 2.34 CM2
BH CV ECHO MEAS - MVA(VTI): 1.92 CM2
BH CV ECHO MEAS - PA ACC TIME: 0.12 SEC
BH CV ECHO MEAS - PA V2 MAX: 103 CM/SEC
BH CV ECHO MEAS - PI END-D VEL: 117 CM/SEC
BH CV ECHO MEAS - RV MAX PG: 1.55 MMHG
BH CV ECHO MEAS - RV V1 MAX: 62.3 CM/SEC
BH CV ECHO MEAS - RV V1 VTI: 14.4 CM
BH CV ECHO MEAS - SV(LVOT): 60 ML
BH CV ECHO MEAS - SV(MOD-SP2): 41.3 ML
BH CV ECHO MEAS - SV(MOD-SP4): 48.5 ML
BH CV ECHO MEAS - SVI(LVOT): 34.7 ML/M2
BH CV ECHO MEAS - SVI(MOD-SP2): 23.9 ML/M2
BH CV ECHO MEAS - SVI(MOD-SP4): 28.1 ML/M2
BH CV ECHO MEAS - TAPSE (>1.6): 2.5 CM
BH CV ECHO MEAS - TR MAX PG: 42 MMHG
BH CV ECHO MEAS - TR MAX VEL: 324 CM/SEC
BH CV ECHO MEASUREMENTS AVERAGE E/E' RATIO: 9.89
BH CV XLRA - RV BASE: 3.9 CM
BH CV XLRA - RV LENGTH: 7.3 CM
BH CV XLRA - RV MID: 2.5 CM
BH CV XLRA - TDI S': 12.3 CM/SEC
LV EF BIPLANE MOD: 48.3 %
SINUS: 3.7 CM
STJ: 2.4 CM

## 2025-02-04 PROCEDURE — 93306 TTE W/DOPPLER COMPLETE: CPT | Performed by: INTERNAL MEDICINE

## 2025-02-04 PROCEDURE — 93356 MYOCRD STRAIN IMG SPCKL TRCK: CPT

## 2025-02-04 PROCEDURE — 93306 TTE W/DOPPLER COMPLETE: CPT

## 2025-02-04 PROCEDURE — 93356 MYOCRD STRAIN IMG SPCKL TRCK: CPT | Performed by: INTERNAL MEDICINE

## 2025-02-05 ENCOUNTER — TELEPHONE (OUTPATIENT)
Dept: CARDIOLOGY | Facility: CLINIC | Age: 65
End: 2025-02-05
Payer: MEDICARE

## 2025-02-05 NOTE — TELEPHONE ENCOUNTER
Echo results per Dr Britton  weak heart muscle is improving, continue medications     2 valves moderate leakate will monitor     Called informed pt

## 2025-03-05 DIAGNOSIS — I10 ESSENTIAL HYPERTENSION: ICD-10-CM

## 2025-03-05 DIAGNOSIS — R00.1 BRADYCARDIA: Primary | ICD-10-CM

## 2025-03-05 RX ORDER — LISINOPRIL 20 MG/1
20 TABLET ORAL DAILY
Qty: 90 TABLET | Refills: 2 | Status: SHIPPED | OUTPATIENT
Start: 2025-03-05

## 2025-03-05 RX ORDER — METOPROLOL SUCCINATE 25 MG/1
TABLET, EXTENDED RELEASE ORAL
Qty: 90 TABLET | Refills: 2 | Status: SHIPPED | OUTPATIENT
Start: 2025-03-05

## 2025-03-05 RX ORDER — AMLODIPINE BESYLATE 10 MG/1
10 TABLET ORAL DAILY
Qty: 90 TABLET | Refills: 2 | Status: SHIPPED | OUTPATIENT
Start: 2025-03-05

## 2025-03-05 NOTE — TELEPHONE ENCOUNTER
Rx Refill Note  Requested Prescriptions     Signed Prescriptions Disp Refills    metoprolol succinate XL (TOPROL-XL) 25 MG 24 hr tablet 90 tablet 2     Sig: TAKE 1 TABLET BY MOUTH DAILY     Authorizing Provider: ARYA PONCE     Ordering User: HELGA ANGEL    lisinopril (PRINIVIL,ZESTRIL) 20 MG tablet 90 tablet 2     Sig: TAKE 1 TABLET BY MOUTH DAILY     Authorizing Provider: ARYA PONCE     Ordering User: HELGA ANGEL    amLODIPine (NORVASC) 10 MG tablet 90 tablet 2     Sig: TAKE 1 TABLET BY MOUTH DAILY     Authorizing Provider: ARYA PONCE     Ordering User: HELGA ANGEL      Last office visit with prescribing clinician: 1/27/2025   Last telemedicine visit with prescribing clinician: Visit date not found   Next office visit with prescribing clinician: 8/28/2025                         Would you like a call back once the refill request has been completed: [] Yes [] No    If the office needs to give you a call back, can they leave a voicemail: [] Yes [] No    Helga Angel MA  03/05/25, 12:41 EST

## 2025-04-29 ENCOUNTER — CLINICAL SUPPORT NO REQUIREMENTS (OUTPATIENT)
Dept: CARDIOLOGY | Facility: CLINIC | Age: 65
End: 2025-04-29
Payer: MEDICARE

## 2025-04-29 DIAGNOSIS — R00.1 BRADYCARDIA: Primary | ICD-10-CM

## 2025-04-29 DIAGNOSIS — Z95.0 PACEMAKER: ICD-10-CM

## 2025-04-29 PROCEDURE — 93288 INTERROG EVL PM/LDLS PM IP: CPT | Performed by: INTERNAL MEDICINE

## 2025-06-30 ENCOUNTER — CLINICAL SUPPORT NO REQUIREMENTS (OUTPATIENT)
Dept: CARDIOLOGY | Facility: CLINIC | Age: 65
End: 2025-06-30
Payer: MEDICARE

## 2025-06-30 DIAGNOSIS — Z95.0 PACEMAKER: ICD-10-CM

## 2025-06-30 DIAGNOSIS — R00.1 BRADYCARDIA: Primary | ICD-10-CM

## (undated) DEVICE — PK ENDO GI 50